# Patient Record
Sex: MALE | Race: WHITE | NOT HISPANIC OR LATINO | Employment: OTHER | URBAN - METROPOLITAN AREA
[De-identification: names, ages, dates, MRNs, and addresses within clinical notes are randomized per-mention and may not be internally consistent; named-entity substitution may affect disease eponyms.]

---

## 2018-08-13 RX ORDER — ASPIRIN 81 MG/1
TABLET ORAL
COMMUNITY

## 2018-08-13 RX ORDER — SIMVASTATIN 40 MG
TABLET ORAL
COMMUNITY
End: 2018-08-15 | Stop reason: ALTCHOICE

## 2018-08-15 ENCOUNTER — OFFICE VISIT (OUTPATIENT)
Dept: UROLOGY | Facility: MEDICAL CENTER | Age: 78
End: 2018-08-15
Payer: MEDICARE

## 2018-08-15 VITALS
WEIGHT: 155 LBS | BODY MASS INDEX: 24.91 KG/M2 | SYSTOLIC BLOOD PRESSURE: 134 MMHG | HEIGHT: 66 IN | DIASTOLIC BLOOD PRESSURE: 74 MMHG

## 2018-08-15 DIAGNOSIS — Z92.3 HISTORY OF RADIATION THERAPY: ICD-10-CM

## 2018-08-15 DIAGNOSIS — C61 PROSTATE CANCER (HCC): ICD-10-CM

## 2018-08-15 DIAGNOSIS — N43.40 SPERMATOCELE OF EPIDIDYMIS: Primary | ICD-10-CM

## 2018-08-15 LAB
SL AMB  POCT GLUCOSE, UA: ABNORMAL
SL AMB LEUKOCYTE ESTERASE,UA: ABNORMAL
SL AMB POCT BILIRUBIN,UA: ABNORMAL
SL AMB POCT BLOOD,UA: ABNORMAL
SL AMB POCT CLARITY,UA: CLEAR
SL AMB POCT COLOR,UA: ABNORMAL
SL AMB POCT KETONES,UA: ABNORMAL
SL AMB POCT NITRITE,UA: ABNORMAL
SL AMB POCT PH,UA: 7
SL AMB POCT SPECIFIC GRAVITY,UA: 1.02
SL AMB POCT URINE PROTEIN: ABNORMAL
SL AMB POCT UROBILINOGEN: 0.2

## 2018-08-15 PROCEDURE — 81003 URINALYSIS AUTO W/O SCOPE: CPT | Performed by: UROLOGY

## 2018-08-15 PROCEDURE — 99204 OFFICE O/P NEW MOD 45 MIN: CPT | Performed by: UROLOGY

## 2018-08-15 RX ORDER — ATORVASTATIN CALCIUM 10 MG/1
10 TABLET, FILM COATED ORAL DAILY
COMMUNITY
End: 2021-09-09 | Stop reason: ALTCHOICE

## 2018-08-15 NOTE — LETTER
August 15, 2018     Jorge Preciado MD  [address] [address]    Patient: Shanti Amezquita   YOB: 1940   Date of Visit: 8/15/2018       Dear Dr Renato Stevens: Thank you for referring Shanti Amezquita to me for evaluation  Below are my notes for this consultation  If you have questions, please do not hesitate to call me  I look forward to following your patient along with you           Sincerely,        Ryder Nolan MD        CC: No Recipients

## 2018-08-15 NOTE — PROGRESS NOTES
Assessment/Plan:      Diagnoses and all orders for this visit:    Spermatocele of epididymis  -     POCT urine dip auto non-scope  -     US scrotum and testicles; Future    Prostate cancer ([facility] [address] )    History of radiation therapy    Other orders  -     aspirin (ADULT ASPIRIN EC LOW STRENGTH) 81 mg EC tablet; Take by mouth  -     Discontinue: simvastatin (ZOCOR) 40 mg tablet; Take by mouth  -     Donepezil HCl (ARICEPT PO); Take by mouth  -     atorvastatin (LIPITOR) 10 mg tablet; Take 10 mg by mouth daily  -     CYANOCOBALAMIN PO; Take by mouth          Subjective:  Left scrotal/testicular mass     Patient ID: Yin Rene is a 77 y o  male  HPI  Last week the patient incidentally noticed a small nontender mass in his left hemiscrotum during a shower  He thought it was attached to his testicle  It does not bother him or cause him any symptoms  He denies any dysuria or urinary problems no fever, flank, inguinal, or testicular pains  No prior history of infection or trauma in that area  He has a history of prostate cancer diagnosed back in 2014 however he finally received definitive treatment with radiation therapy in [place]  He denies any urinary symptoms or problems to report  Review of Systems   Constitutional: Negative  HENT: Negative  Eyes: Negative  Respiratory: Negative  Cardiovascular: Negative  Gastrointestinal: Negative  Endocrine: Negative  Genitourinary: Negative  Negative for difficulty urinating, penile pain, penile swelling and scrotal swelling  Musculoskeletal: Positive for arthralgias, back pain, gait problem and joint swelling  Skin: Negative  Allergic/Immunologic: Negative  Hematological: Negative  Psychiatric/Behavioral: Negative  Objective:     Physical Exam   Constitutional: He is oriented to person, place, and time  He appears well-developed and well-nourished  No distress  HENT:   Head: Normocephalic and atraumatic     Nose: Nose normal    Mouth/Throat: Oropharynx is clear and moist    Eyes: Conjunctivae and EOM are normal  Pupils are equal, round, and reactive to light  No scleral icterus  Neck: Normal range of motion  Neck supple  Pulmonary/Chest: Effort normal  No respiratory distress  Abdominal: Soft  Bowel sounds are normal  He exhibits no distension and no mass  There is no tenderness  Hernia confirmed negative in the right inguinal area and confirmed negative in the left inguinal area  Genitourinary: Penis normal  Left testis shows mass  Left testis shows no swelling and no tenderness  Uncircumcised  Genitourinary Comments: About a 1 to 1 1/2 cm nontender mass in the tail of the epididymis, consistent with a spermatocele   Musculoskeletal: Normal range of motion  He exhibits no edema or tenderness  Lymphadenopathy:     He has no cervical adenopathy  Neurological: He is alert and oriented to person, place, and time  No cranial nerve deficit  Skin: Skin is warm and dry  No rash noted  No erythema  No pallor  Psychiatric: He has a normal mood and affect  His behavior is normal  Judgment and thought content normal    Nursing note and vitals reviewed        PSA from May 2018 was 0 6

## 2018-08-17 ENCOUNTER — HOSPITAL ENCOUNTER (OUTPATIENT)
Dept: ULTRASOUND IMAGING | Facility: HOSPITAL | Age: 78
Discharge: HOME | End: 2018-08-17
Attending: UROLOGY
Payer: MEDICARE

## 2018-08-17 DIAGNOSIS — N43.40 SPERMATOCELE OF EPIDIDYMIS: ICD-10-CM

## 2018-08-17 PROCEDURE — 76870 US EXAM SCROTUM: CPT

## 2021-02-03 ENCOUNTER — IMMUNIZATIONS (OUTPATIENT)
Dept: FAMILY MEDICINE CLINIC | Facility: HOSPITAL | Age: 81
End: 2021-02-03

## 2021-02-03 DIAGNOSIS — Z23 ENCOUNTER FOR IMMUNIZATION: Primary | ICD-10-CM

## 2021-02-03 PROCEDURE — 91301 SARS-COV-2 / COVID-19 MRNA VACCINE (MODERNA) 100 MCG: CPT

## 2021-02-03 PROCEDURE — 0011A SARS-COV-2 / COVID-19 MRNA VACCINE (MODERNA) 100 MCG: CPT

## 2021-03-02 ENCOUNTER — IMMUNIZATIONS (OUTPATIENT)
Dept: FAMILY MEDICINE CLINIC | Facility: HOSPITAL | Age: 81
End: 2021-03-02

## 2021-03-02 DIAGNOSIS — Z23 ENCOUNTER FOR IMMUNIZATION: Primary | ICD-10-CM

## 2021-03-02 PROCEDURE — 0012A SARS-COV-2 / COVID-19 MRNA VACCINE (MODERNA) 100 MCG: CPT

## 2021-03-02 PROCEDURE — 91301 SARS-COV-2 / COVID-19 MRNA VACCINE (MODERNA) 100 MCG: CPT

## 2021-03-11 ENCOUNTER — APPOINTMENT (OUTPATIENT)
Dept: LAB | Facility: HOSPITAL | Age: 81
End: 2021-03-11
Attending: PSYCHIATRY & NEUROLOGY
Payer: MEDICARE

## 2021-03-11 ENCOUNTER — TRANSCRIBE ORDERS (OUTPATIENT)
Dept: ADMINISTRATIVE | Facility: HOSPITAL | Age: 81
End: 2021-03-11

## 2021-03-11 DIAGNOSIS — R53.1 WEAKNESS: ICD-10-CM

## 2021-03-11 DIAGNOSIS — B20: ICD-10-CM

## 2021-03-11 DIAGNOSIS — B20: Primary | ICD-10-CM

## 2021-03-11 LAB
ALBUMIN SERPL BCP-MCNC: 3.5 G/DL (ref 3.5–5)
ALP SERPL-CCNC: 66 U/L (ref 46–116)
ALT SERPL W P-5'-P-CCNC: 17 U/L (ref 12–78)
AST SERPL W P-5'-P-CCNC: 11 U/L (ref 5–45)
BILIRUB DIRECT SERPL-MCNC: 0.09 MG/DL (ref 0–0.2)
BILIRUB SERPL-MCNC: 0.36 MG/DL (ref 0.2–1)
PROT SERPL-MCNC: 6.8 G/DL (ref 6.4–8.2)
TSH SERPL DL<=0.05 MIU/L-ACNC: 1.41 UIU/ML (ref 0.36–3.74)

## 2021-03-11 PROCEDURE — 82746 ASSAY OF FOLIC ACID SERUM: CPT

## 2021-03-11 PROCEDURE — 36415 COLL VENOUS BLD VENIPUNCTURE: CPT

## 2021-03-11 PROCEDURE — 84443 ASSAY THYROID STIM HORMONE: CPT

## 2021-03-11 PROCEDURE — 80076 HEPATIC FUNCTION PANEL: CPT

## 2021-03-11 PROCEDURE — 82607 VITAMIN B-12: CPT

## 2021-03-12 ENCOUNTER — TRANSCRIBE ORDERS (OUTPATIENT)
Dept: ADMINISTRATIVE | Facility: HOSPITAL | Age: 81
End: 2021-03-12

## 2021-03-12 DIAGNOSIS — R41.3 MEMORY LOSS: Primary | ICD-10-CM

## 2021-03-12 LAB
FOLATE SERPL-MCNC: 13.5 NG/ML (ref 3.1–17.5)
VIT B12 SERPL-MCNC: 227 PG/ML (ref 100–900)

## 2021-03-26 ENCOUNTER — HOSPITAL ENCOUNTER (OUTPATIENT)
Dept: MRI IMAGING | Facility: HOSPITAL | Age: 81
Discharge: HOME | End: 2021-03-26
Attending: PSYCHIATRY & NEUROLOGY
Payer: MEDICARE

## 2021-03-26 DIAGNOSIS — R41.3 MEMORY LOSS: ICD-10-CM

## 2021-03-26 PROCEDURE — 70551 MRI BRAIN STEM W/O DYE: CPT

## 2021-03-26 PROCEDURE — G1004 CDSM NDSC: HCPCS

## 2021-04-28 ENCOUNTER — TELEPHONE (OUTPATIENT)
Dept: FAMILY MEDICINE CLINIC | Facility: HOSPITAL | Age: 81
End: 2021-04-28

## 2021-07-14 ENCOUNTER — TELEPHONE (OUTPATIENT)
Dept: GERIATRICS | Age: 81
End: 2021-07-14

## 2021-07-14 NOTE — TELEPHONE ENCOUNTER
[facility]  [address] [place] [address] Drive [address] [place] [address]  [phone]    Telephone Intake: Geriatric Assessment     Referral source: Self Referral                                   Caller who is scheduling/relationship to patient: [facility] [place] (spouse)  Caller's phone number: [phone]              Is there a Power of  in place/If so, who? Yes , spouse     Reason for referral: Family member concerns regarding memory concerns  What is the goal of visit? patient was being followed by Neuro in [place]; has since moved to this area and wants to continue to be followed for his memory/dementia  Family also concerned about being prescribed Memantine Hydrochloride 10MG by his prior Neurologist and has not been followed up with patient/medication  Has the patient been seen by a Neurologist or Geriatrician? Yes (Neurologist)  Has the patient ever been diagnosed with dementia? Yes       Preferred language? English  Highest education level? BS in Chemical Engineering and [employer] (started his [employer])  Does the patient wear glasses? No  Does the patient use hearing aids? No     Does the patient and/or caregiver have access for a virtual visit (computer or smart phone, working audio and video)? No    Caller was informed:    Please make sure the patient is accompanied by someone who knows them well / a caregiver / family member to participate in this appointment  If a patient plans to attend the assessment alone, please route a message to the assigned provider   Primary number on file will be called to confirm this appointment  Who will accompany the patient? Hero Wilson mailed out to: [id number] [facility] [id number]    NOTE FOR : If the patient was recently hospitalized, please route intake to assigned provider for chart review  Neurologist:  Dr Bebo Zavala 54   Phone: [phone]  Fax: [phone]

## 2021-07-22 ENCOUNTER — TELEPHONE (OUTPATIENT)
Dept: GERIATRICS | Age: 81
End: 2021-07-22

## 2021-07-22 NOTE — TELEPHONE ENCOUNTER
Left voice mail asking for call back  Wait list patient called to offer August appts       Offering 8/5 4 pm, 8/12 4pm as assessments and 8/27 at [address] [place] [address] or  for conference visit

## 2021-08-12 ENCOUNTER — CONSULT (OUTPATIENT)
Dept: GERIATRICS | Age: 81
End: 2021-08-12
Payer: MEDICARE

## 2021-08-12 ENCOUNTER — TELEPHONE (OUTPATIENT)
Dept: GERIATRICS | Age: 81
End: 2021-08-12

## 2021-08-12 VITALS
OXYGEN SATURATION: 98 % | SYSTOLIC BLOOD PRESSURE: 150 MMHG | HEART RATE: 64 BPM | RESPIRATION RATE: 16 BRPM | HEIGHT: 65 IN | DIASTOLIC BLOOD PRESSURE: 78 MMHG | BODY MASS INDEX: 24.21 KG/M2 | TEMPERATURE: 97.4 F | WEIGHT: 145.3 LBS

## 2021-08-12 DIAGNOSIS — G31.84 MCI (MILD COGNITIVE IMPAIRMENT): Primary | ICD-10-CM

## 2021-08-12 DIAGNOSIS — E78.49 OTHER HYPERLIPIDEMIA: ICD-10-CM

## 2021-08-12 DIAGNOSIS — H91.90 HEARING LOSS, UNSPECIFIED HEARING LOSS TYPE, UNSPECIFIED LATERALITY: ICD-10-CM

## 2021-08-12 DIAGNOSIS — K59.09 OTHER CONSTIPATION: ICD-10-CM

## 2021-08-12 DIAGNOSIS — R03.0 ELEVATED BP WITHOUT DIAGNOSIS OF HYPERTENSION: ICD-10-CM

## 2021-08-12 PROCEDURE — 99205 OFFICE O/P NEW HI 60 MIN: CPT | Performed by: STUDENT IN AN ORGANIZED HEALTH CARE EDUCATION/TRAINING PROGRAM

## 2021-08-12 NOTE — TELEPHONE ENCOUNTER
Left voicemail message on home phone requesting call back to office    Office to provide information regarding today's appointment in office with provider being virtual

## 2021-08-12 NOTE — ASSESSMENT & PLAN NOTE
/78   Recommend patient follow PCP monitoring encourage a heart healthy diet, physical as able as part an exercise routine

## 2021-08-12 NOTE — PROGRESS NOTES
This note was not shared with the patient due to reasonable likelihood of causing patient harm    Assessment & Plan:   Junior Bowens was seen today for geriatric evaluation  Diagnoses and all orders for this visit:    MCI (mild cognitive impairment)  -     MRI brain NeuroQuant wo contrast; Future  -     MindStreams Assessment; Future    Elevated BP without diagnosis of hypertension    Other hyperlipidemia    Other constipation    Hearing loss, unspecified hearing loss type, unspecified laterality      Elevated BP without diagnosis of hypertension   /78   Recommend patient follow PCP monitoring encourage a heart healthy diet, physical as able as part an exercise routine    MCI (mild cognitive impairment)  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    Will refer for neurotrax testing  Will order MRI neuro quant of the brain for volumetric analysis  Manage vascular risk factors as patient's last lipid panel showed elevated cholesterol and LDL however he has not been taking the atorvastatin, follow-up closely with PCP   Will refer to occupational therapy for fit to drive  Will refer to speech therapy for cognitive rehabilitation  Encourage patient to enroll in a senior center for positive socialization and physical activity  Recommend the Mediterranean diet which has shown to decrease risk of memory loss and CVA   Patient previously on Namenda as well as Aricept with no improvement or delay in symptoms  Encourage online games such as [employer] Universe, Brain HQ, Cognifit  Pending complete workup, if positive findings for Alzheimer's, patient may qualify for adacanumab clinical trial   Will discuss alternative clinical trials at family if they are interested  Reorientation redirection as needed  Manage chronic conditions  Maintain [place] precautions  Encourage patient to remain active mentally, physically and socially  Patient should participate in cognitively challenging exercises as able      Other hyperlipidemia   Lipid panel done 12/20 showed significant hyperlipidemia  Patient currently not compliant with atorvastatin  Recommend adherence to a heart healthy diet, exercise program  Follow-up with PCP for continued monitoring    Other constipation  Encourage increased fiber intake  Consider prune juice 3 oz daily  Per medical records, patient's PCP has documented that the patient is on MiraLax and Benefiber was added however these are not on his medication list   Physical activity as able    Hearing loss   Per wife, patient with a history of hearing loss   Was previously assessed by audiology per wife  Recommend facing patient directly and standing in close proximity when communicating to avoid confusion  Maintain [place] precautions   Consider revisiting audiology in the future          HPI:  We had the pleasure of evaluating Shante Rich who is a 79 y o  male   in Geriatric consultation today  He lives with his wife  Mr Rosy Davis is in the office with his wife      Memory Issues noticed since 5 years ago   Memory affected:  short term memory loss    Symptoms started: gradual  Over time the memory has:  worsened  Memory issue(s) were noted by: family   Patient has difficulties with recalling short-term events    He has no problems operating household appliance such as TV remote, kitchen appliances, computer  This is an 79-year-old male with hyperlipidemia, constipation and cognitive impairment who presents for his initial comprehensive geriatric assessment    The patient currently resides with his wife whom he has been  to for 15 years in a one-suzy home (indoor pool in basement)  He was previously  and has 4 children from that marriage  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  The patient does have a prosthesis for his right leg since childhood  Wife explains that over the past few years, the patient has had progressively worsening short-term memory  She explains that they have lived in various states however due to her chronic conditions, they have now formally settled in [place]  The patient did previously follow with Neurology and Geriatrics in [facility] where he was diagnosed with mild cognitive impairment  Medical records show that the patient was treated with Aricept and Namenda  Wife explains that there have been no overall improvement in cognitive symptoms with these medications nor did  she feel decline was delayed  Since moving to [place], wife explains that the patient was assessed by 2 neurologists however  she states she was not happy with their assessment as a result of which she is here at our office today  She describes the patient as being intermittently confused however is easily redirected  She gives an example of repeatedly describing the same breakfast recipe for the patient however he forgets daily how to berry eggs and ham   She also relates that she seldom drives with the patient as she does not feel safe with his driving  She denies him having any involvement in any motor vehicle accidents nor is she able to state whether he may have gotten lost while driving on his own  He does have several scratches and dents on the car from backing out of the garage  The patient takes his own medications however he is only on aspirin currently    Wife denies the patient having any safety concerns in the home involving the stove, burning food or leaving the faucets running  Wife also explains that over 1 week ago, the patient purchased a progressive car insurance  Upon receiving communication in the mail about the new policy, the patient denied recollecting purchasing the new policy  Wife subsequently did have to call and cancel the  insurance as the patient already had an auto insurance for that vehicle  The majority of the patient's bills are on autopay and wife explains that the patient is very rigid and set in managing his checkbook which he takes with him everywhere  The patient does wear depends at nighttime however wife denies him having any urine or stool incontinence  He does have a history of constipation and previously did drink alcohol often however he no longer does  No recent falls, changes in mood, personality or behavior  The patient continues to tolerate oral intake, has been sleeping well and denies any cardiorespiratory distress, fever, chills, URI or urinary symptoms  During the office visit today, the patient was notably very repetitive though had appropriate conversation for the majority of the interview   Wife was very concerned that she would not be in the room with the patient when he was interviewed by me as she felt he often fabricates information  I explained to the patient's wife that  the patient and family member is usually our intake process and would certainly be aware of her concerns  The patient's wife did relate that the patient is not close to any of his children sadly          He has difficulty finding the right word while speaking: No  Patient requires repeat information or ask the same question repeatedly: Yes  Do you drive: Yes       Have you had any recent accidents, citations or getting lost in familiar places :No  Do you handle your own financial affairs such as balancing your checkbook, paying bills, investments: Yes  Do have any difficulties with handling your financial affairs: Yes  Have you or your family noted any change in your mood or personality:No  Are you currently or have you been treated in the past for depression or anxiety: No  Have you noticed any gait or balance disorder: Yes  Uses :none  Any hallucination or delusion: No  Fluctuation in alertness: No  Sleep Issues: No  Urinary/Stool Incontinence: No  Hearing and vision issue: Yes  Do you have POA:Yes  Do you have a Living will Yes  Past Medical, surgical, social, medication and allergy history and patients previous records reviewed  Family Review of Behavior [facility]:    pacing  No    agressive/combative behavior  No    agitated  No   wandering  No   resistance to care  No   hoarding/hiding objects  No    suspicious  No  Withdrawn:no  inappropriate sexual behaviorNo  rummaging/pillaging  No    misplacing/losing objects No  personal hygiene problems  Yes  forgetfulness of actions Yes   temper outbursts  No     throwing items No      Family member with dementia and what type? no  Have you had any head trauma No  Does patient have history of alcohol abuse No      ROS: Review of Systems   Constitutional: Negative for activity change, chills and fever  HENT: Positive for hearing loss (per wife)  Negative for congestion, ear pain, rhinorrhea and sore throat  Eyes: Negative for discharge  Respiratory: Negative for cough, chest tightness, shortness of breath, wheezing and stridor  Cardiovascular: Negative for chest pain, palpitations and leg swelling  Gastrointestinal: Positive for constipation  Negative for abdominal pain, diarrhea, nausea and vomiting  Genitourinary: Negative for decreased urine volume and dysuria  Musculoskeletal: Positive for gait problem (2/2 leg prosthesis)  Skin: Negative for color change, pallor, rash and wound  Neurological: Negative for dizziness and light-headedness     Psychiatric/Behavioral: Positive for decreased concentration  Negative for sleep disturbance  Allergies:   No Known Allergies    Medications:      Current Outpatient Medications:     aspirin (ADULT ASPIRIN EC LOW STRENGTH) 81 mg EC tablet, Take by mouth, Disp: , Rfl:     Donepezil HCl (ARICEPT PO), Take by mouth, Disp: , Rfl:     atorvastatin (LIPITOR) 10 mg tablet, Take 10 mg by mouth daily (Patient not taking: Reported on 8/12/2021), Disp: , Rfl:     CYANOCOBALAMIN PO, Take by mouth (Patient not taking: Reported on 8/12/2021), Disp: , Rfl:     Vitals:  Vitals:    08/12/21 1609   BP: 150/78   Pulse: 64   Resp: 16   Temp: (!) 97 4 °F (36 3 °C)   SpO2: 98%       History:  Past Medical History:   Diagnosis Date    Personal history of irradiation     [facility]      Past Surgical History:   Procedure Laterality Date    PROSTATE BIOPSY       Family History   Problem Relation Age of Onset    Colon cancer Mother     Nephrolithiasis Father     Stroke Father     Heart disease Father      Social History     Socioeconomic History    Marital status: /Civil Union     Spouse name: Not on file    Number of children: Not on file    Years of education: Not on file    Highest education level: Not on file   Occupational History    Not on file   Tobacco Use    Smoking status: Never Smoker    Smokeless tobacco: Never Used   Substance and Sexual Activity    Alcohol use: Yes     Comment: 2/day    Drug use: No    Sexual activity: Not on file   Other Topics Concern    Not on file   Social History Narrative    Not on file     Social Determinants of Health     Financial Resource Strain:     Difficulty of Paying Living Expenses:    Food Insecurity:     Worried About Running Out of Food in the Last Year:     Ran Out of Food in the Last Year:    Transportation Needs:     Lack of Transportation (Medical):      Lack of Transportation (Non-Medical):    Physical Activity:     Days of Exercise per Week:     Minutes of Exercise per Session: Stress:     Feeling of Stress :    Social Connections:     Frequency of Communication with Friends and Family:     Frequency of Social Gatherings with Friends and Family:     Attends Hoahaoism Services:     Active Member of Clubs or Organizations:     Attends Club or Organization Meetings:     Marital Status:    Intimate Partner Violence:     Fear of Current or Ex-Partner:     Emotionally Abused:     Physically Abused:     Sexually Abused:      Past Surgical History:   Procedure Laterality Date    PROSTATE BIOPSY           Physical Exam:   Physical Exam  Vitals reviewed  Constitutional:       General: He is not in acute distress  Appearance: Normal appearance  He is not ill-appearing or diaphoretic  HENT:      Head: Normocephalic and atraumatic  Right Ear: External ear normal       Left Ear: External ear normal       Nose: Nose normal  No congestion  Mouth/Throat:      Mouth: Mucous membranes are moist    Eyes:      General: No scleral icterus  Right eye: No discharge  Left eye: No discharge  Conjunctiva/sclera: Conjunctivae normal    Pulmonary:      Effort: Pulmonary effort is normal  No respiratory distress  Abdominal:      General: There is no distension  Palpations: Abdomen is soft  Tenderness: There is no abdominal tenderness  Musculoskeletal:         General: Normal range of motion  Cervical back: Normal range of motion  Left lower leg: No edema  Comments: Right leg prosthesis   Skin:     General: Skin is dry  Neurological:      General: No focal deficit present  Mental Status: He is alert  Mental status is at baseline  Gait: Gait abnormal (2/2 prosthesis)
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
## 2021-08-13 NOTE — ASSESSMENT & PLAN NOTE
Per wife, patient with a history of hearing loss   Was previously assessed by audiology per wife  Recommend facing patient directly and standing in close proximity when communicating to avoid confusion  Maintain Falls precautions   Consider revisiting audiology in the future

## 2021-08-13 NOTE — ASSESSMENT & PLAN NOTE
Encourage increased fiber intake  Consider prune juice 3 oz daily  Per medical records, patient's PCP has documented that the patient is on MiraLax and Benefiber was added however these are not on his medication list   Physical activity as able

## 2021-08-13 NOTE — ASSESSMENT & PLAN NOTE
Lipid panel done 12/20 showed significant hyperlipidemia  Patient currently not compliant with atorvastatin  Recommend adherence to a heart healthy diet, exercise program  Follow-up with PCP for continued monitoring

## 2021-08-26 ENCOUNTER — CLINICAL SUPPORT (OUTPATIENT)
Dept: GERIATRICS | Age: 81
End: 2021-08-26
Payer: MEDICARE

## 2021-08-26 ENCOUNTER — HOSPITAL ENCOUNTER (OUTPATIENT)
Dept: MRI IMAGING | Facility: HOSPITAL | Age: 81
Discharge: HOME | End: 2021-08-26
Payer: MEDICARE

## 2021-08-26 DIAGNOSIS — G31.84 MCI (MILD COGNITIVE IMPAIRMENT): ICD-10-CM

## 2021-08-26 PROCEDURE — 70551 MRI BRAIN STEM W/O DYE: CPT

## 2021-08-26 PROCEDURE — 96139 PSYCL/NRPSYC TST TECH EA: CPT | Performed by: STUDENT IN AN ORGANIZED HEALTH CARE EDUCATION/TRAINING PROGRAM

## 2021-08-26 PROCEDURE — 96138 PSYCL/NRPSYC TECH 1ST: CPT | Performed by: STUDENT IN AN ORGANIZED HEALTH CARE EDUCATION/TRAINING PROGRAM

## 2021-08-26 NOTE — PROGRESS NOTES
[address]  [phone]    Carla Pay was here today for [facility] comprehensive test  It took the patient 60 minutes to complete

## 2021-09-01 ENCOUNTER — OFFICE VISIT (OUTPATIENT)
Dept: DERMATOLOGY | Facility: CLINIC | Age: 81
End: 2021-09-01
Payer: MEDICARE

## 2021-09-01 DIAGNOSIS — Z13.89 SCREENING FOR SKIN CONDITION: ICD-10-CM

## 2021-09-01 DIAGNOSIS — L40.9 PSORIASIS: ICD-10-CM

## 2021-09-01 DIAGNOSIS — L98.0 PYOGENIC GRANULOMA: Primary | ICD-10-CM

## 2021-09-01 PROCEDURE — 99203 OFFICE O/P NEW LOW 30 MIN: CPT | Performed by: DERMATOLOGY

## 2021-09-01 PROCEDURE — 11305 SHAVE SKIN LESION 0.5 CM/<: CPT | Performed by: DERMATOLOGY

## 2021-09-01 PROCEDURE — 88305 TISSUE EXAM BY PATHOLOGIST: CPT | Performed by: STUDENT IN AN ORGANIZED HEALTH CARE EDUCATION/TRAINING PROGRAM

## 2021-09-01 RX ORDER — MEMANTINE HYDROCHLORIDE 10 MG/1
10 TABLET ORAL 2 TIMES DAILY
COMMUNITY

## 2021-09-01 NOTE — PATIENT INSTRUCTIONS
Pyogenic granuloma lesion removed hopefully will resolve will recheck in a couple weeks  Psoriasis    Patient advised that this is an autoimmune process where the immune system attacks skin and causes the skin to grow more quickly normally replace of skin cells every 5 for 6 weeks we have psoriasis we replace skin cells every 5 or 6 days  Screening for Dermatologic Disorders: Nothing else of concern noted on complete exam follow up in 1 year

## 2021-09-01 NOTE — PROGRESS NOTES
[facility] DERMATOLOGY  [address] [place] [id number]  [phone]  [phone]     MRN: [id number] : 1940  Encounter: [id number]  Patient Information: Susie Banres  Chief complaint:  Lesion on left 4th finger    History of present illness:  68-year-old male presents for concerns for bleeding lesion on his left 4th finger  Patient was seen by his primary care physician who for for her over for this lesion concerned that it might be a skin cancer  Patient with history of psoriasis he denies any injury to the area  Past Medical History:   Diagnosis Date    Memory loss     Personal history of irradiation     Prostate cancer [facility])      Past Surgical History:   Procedure Laterality Date    PROSTATE BIOPSY       Social History   Social History     Substance and Sexual Activity   Alcohol Use Yes    Comment: 2/day     Social History     Substance and Sexual Activity   Drug Use No     Social History     Tobacco Use   Smoking Status Never Smoker   Smokeless Tobacco Never Used     Family History   Problem Relation Age of Onset    Colon cancer Mother     Nephrolithiasis Father     Stroke Father     Heart disease Father      Meds/Allergies   No Known Allergies    Meds:  Prior to Admission medications    Medication Sig Start Date End Date Taking?  Authorizing Provider   aspirin (ADULT ASPIRIN EC LOW STRENGTH) 81 mg EC tablet Take by mouth   Yes Historical Provider, MD   memantine (NAMENDA) 10 mg tablet Take 10 mg by mouth 2 (two) times a day   Yes Historical Provider, MD   atorvastatin (LIPITOR) 10 mg tablet Take 10 mg by mouth daily  Patient not taking: Reported on 2021    Historical Provider, MD   CYANOCOBALAMIN PO Take by mouth  Patient not taking: Reported on 2021    Historical Provider, MD   Donepezil HCl (ARICEPT PO) Take by mouth  Patient not taking: Reported on 2021    Historical Provider, MD       Subjective:     Review of Systems:    General: negative for - chills, fatigue, fever,  weight gain or weight loss  Psychological: negative for - anxiety, behavioral disorder, concentration difficulties, decreased libido, depression, irritability, memory difficulties, mood swings, sleep disturbances or suicidal ideation  ENT: negative for - hearing difficulties , nasal congestion, nasal discharge, oral lesions, sinus pain, sneezing, sore throat  Allergy and Immunology: negative for - hives, insect bite sensitivity,  Hematological and Lymphatic: negative for - bleeding problems, blood clots,bruising, swollen lymph nodes  Endocrine: negative for - hair pattern changes, hot flashes, malaise/lethargy, mood swings, palpitations, polydipsia/polyuria, skin changes, temperature intolerance or unexpected weight change  Respiratory: negative for - cough, hemoptysis, orthopnea, shortness of breath, or wheezing  Cardiovascular: negative for - chest pain, dyspnea on exertion, edema,  Gastrointestinal: negative for - abdominal pain, nausea/vomiting  Genito-Urinary: negative for - dysuria, incontinence, irregular/heavy menses or urinary frequency/urgency  Musculoskeletal: negative for - gait disturbance, joint pain, joint stiffness, joint swelling, muscle pain, muscular weakness  Dermatological:  As in HPI  Neurological: negative for confusion, dizziness, headaches, impaired coordination/balance, memory loss, numbness/tingling, seizures, speech problems, tremors or weakness       Objective: There were no vitals taken for this visit      Physical Exam:    General Appearance:    Alert, cooperative, no distress   Head:    Normocephalic, without obvious abnormality, atraumatic           Skin:   A full skin exam was performed including scalp, head scalp, eyes, ears, nose, lips, neck, chest, axilla, abdomen, back, buttocks, bilateral upper extremities, bilateral lower extremities, hands, feet, fingers, toes, fingernails, and toenails violaceous papule left 4th finger with friable growth 4 mm size  Erythematous scaling well-demarcated plaque noted on the posterior scalp no other evidence of psoriasis elsewhere   Shave excision Procedure Note    Pre-operative Diagnosis:  Pyogenic granuloma    Plan:  1  Instructed to keep the wound dry and covered for 24 and clean thereafter  2  Warning signs of infection were reviewed  3  Recommended that the patient use OTC acetaminophen as needed for pain  Locations:  Left 4th finger  Indications:  Bleeding lesion    Anesthesia: Lidocaine 1% with epinephrine without added sodium bicarbonate    Procedure Details     Patient informed of the risks (including bleeding and infection) and benefits of the   procedure and Verbal informed consent obtained  The lesion and surrounding area were prepped using alcohol  A Blue blade razor was used to remove the lesion  Hemostasis achieved with aluminum chloride  Petrolatum and a sterile dressing applied  The specimen was sent for pathologic examination  The patient tolerated the procedure(s) well  Complications:  none  Assessment:     1  Pyogenic granuloma     2  Psoriasis     3  Screening for skin condition           Plan:   Pyogenic granuloma lesion removed hopefully will resolve will recheck in a couple weeks  Psoriasis    Patient advised that this is an autoimmune process where the immune system attacks skin and causes the skin to grow more quickly normally replace of skin cells every 5 for 6 weeks we have psoriasis we replace skin cells every 5 or 6 days  Screening for Dermatologic Disorders: Nothing else of concern noted on complete exam follow up in 1 year       Negrito Jiang MD  9/1/2021,3:00 PM    Portions of the record may have been created with voice recognition software   Occasional wrong word or "sound a like" substitutions may have occurred due to the inherent limitations of voice recognition software   Read the chart carefully and recognize, using context, where substitutions have occurred

## 2021-09-09 ENCOUNTER — OFFICE VISIT (OUTPATIENT)
Dept: GERIATRICS | Age: 81
End: 2021-09-09
Payer: MEDICARE

## 2021-09-09 ENCOUNTER — TELEPHONE (OUTPATIENT)
Dept: DERMATOLOGY | Facility: CLINIC | Age: 81
End: 2021-09-09

## 2021-09-09 VITALS
HEART RATE: 66 BPM | WEIGHT: 142.4 LBS | SYSTOLIC BLOOD PRESSURE: 142 MMHG | TEMPERATURE: 97.6 F | HEIGHT: 65 IN | DIASTOLIC BLOOD PRESSURE: 78 MMHG | OXYGEN SATURATION: 96 % | RESPIRATION RATE: 16 BRPM | BODY MASS INDEX: 23.72 KG/M2

## 2021-09-09 DIAGNOSIS — E78.49 OTHER HYPERLIPIDEMIA: ICD-10-CM

## 2021-09-09 DIAGNOSIS — K59.09 OTHER CONSTIPATION: ICD-10-CM

## 2021-09-09 DIAGNOSIS — H91.90 HEARING LOSS, UNSPECIFIED HEARING LOSS TYPE, UNSPECIFIED LATERALITY: ICD-10-CM

## 2021-09-09 DIAGNOSIS — R03.0 ELEVATED BP WITHOUT DIAGNOSIS OF HYPERTENSION: ICD-10-CM

## 2021-09-09 DIAGNOSIS — F03.90 MILD DEMENTIA (HCC): Primary | ICD-10-CM

## 2021-09-09 PROBLEM — F03.A0 MILD DEMENTIA: Status: ACTIVE | Noted: 2021-08-12

## 2021-09-09 PROCEDURE — 99215 OFFICE O/P EST HI 40 MIN: CPT | Performed by: STUDENT IN AN ORGANIZED HEALTH CARE EDUCATION/TRAINING PROGRAM

## 2021-09-09 RX ORDER — ROSUVASTATIN CALCIUM 10 MG/1
TABLET, COATED ORAL
Qty: 30 TABLET | Refills: 1 | Status: SHIPPED | OUTPATIENT
Start: 2021-09-09 | End: 2021-11-16 | Stop reason: SDUPTHER

## 2021-09-09 NOTE — TELEPHONE ENCOUNTER
----- Message from Sammi Mora MD sent at 9/7/2021  6:03 PM EDT -----  Please call him of normal pathology

## 2021-09-10 ENCOUNTER — TELEPHONE (OUTPATIENT)
Dept: GERIATRICS | Age: 81
End: 2021-09-10

## 2021-09-10 NOTE — TELEPHONE ENCOUNTER
----- Message from Deandre Khan MD sent at 9/9/2021  5:36 PM EDT -----  Pls call the pt's wife and let her know that I've sentin rosuvastatin   The pt should start 5mg (half tablet) at nightime for 10 days then if no side effects, increase to 10mg (1 tablet) thereafter

## 2021-09-10 NOTE — PROGRESS NOTES
Yahaira [facility]  [address] [facility], [address] [place] [address], [address] [place] [address]     CARE PLAN CONFERENCE      NAME: Susan Kirk  AGE: 79 y o  SEX: male    DATE OF ENCOUNTER: 9/9/2021    Assessment and Plan     Problem List Items Addressed This Visit        Nervous and Auditory    Mild dementia ([address] ) - Primary    Relevant Orders    Ambulatory referral to Occupational Therapy    Hearing loss       Other    Elevated BP without diagnosis of hypertension    Other hyperlipidemia    Relevant Medications    rosuvastatin (CRESTOR) 10 MG tablet    Other constipation          Orders Placed This Encounter   Procedures    Ambulatory referral to Occupational Therapy       - Counseling Documentation: patient was counseled regarding: diagnostic results, instructions for management, risk factor reductions, prognosis, patient and family education, impressions, risks and benefits of treatment options and importance of compliance with treatment    Chief Complaint     Chief Complaint   Patient presents with    care conference       History of Present Illness     HPI    The following portions of the patient's history were reviewed and updated as appropriate: allergies, current medications, past family history, past medical history, past social history, past surgical history and problem list     Review of Systems     Review of Systems   Constitutional: Positive for activity change  Negative for chills and fever  HENT: Positive for hearing loss  Negative for congestion, ear pain, rhinorrhea and sore throat  Eyes: Negative for discharge  Respiratory: Negative for cough, chest tightness, shortness of breath, wheezing and stridor  Cardiovascular: Negative for chest pain, palpitations and leg swelling  Gastrointestinal: Negative for abdominal pain, constipation, diarrhea, nausea and vomiting  Genitourinary: Negative for decreased urine volume, dysuria and hematuria     Musculoskeletal: Positive for gait problem (2/2 leg prosthesis)  Skin: Negative for color change, pallor, rash and wound  Neurological: Negative for dizziness, weakness and light-headedness  Psychiatric/Behavioral: Positive for decreased concentration  Negative for dysphoric mood, hallucinations and sleep disturbance  Active Problem List     Patient Active Problem List   Diagnosis    Mild dementia ([address] )    Elevated BP without diagnosis of hypertension    Other hyperlipidemia    Other constipation    Hearing loss       Objective     /78 (BP Location: Left arm, Patient Position: Sitting, Cuff Size: Adult)   Pulse 66   Temp 97 6 °F (36 4 °C) (Temporal)   Resp 16   Ht 5' 4 5" (1 638 m)   Wt 64 6 kg (142 lb 6 4 oz)   SpO2 96%   BMI 24 07 kg/m²     Physical Exam  Vitals reviewed  Constitutional:       General: He is not in acute distress  Appearance: Normal appearance  He is well-developed  He is not ill-appearing or diaphoretic  Comments: Elderly male sitting comfortably in chair in no obvious cardiorespiratory or painful distress   HENT:      Head: Normocephalic and atraumatic  Right Ear: Ear canal and external ear normal  There is no impacted cerumen  Left Ear: Ear canal and external ear normal  There is no impacted cerumen  Nose: Nose normal       Mouth/Throat:      Mouth: Mucous membranes are moist       Pharynx: Oropharynx is clear  No oropharyngeal exudate or posterior oropharyngeal erythema  Eyes:      General: No scleral icterus  Right eye: No discharge  Left eye: No discharge  Conjunctiva/sclera: Conjunctivae normal    Neck:      Vascular: No JVD  Cardiovascular:      Rate and Rhythm: Normal rate and regular rhythm  Heart sounds: Murmur heard  No friction rub  No gallop  Pulmonary:      Effort: Pulmonary effort is normal  No respiratory distress  Breath sounds: Normal breath sounds  No wheezing or rales     Abdominal:      General: Bowel sounds are normal  There is no distension  Palpations: Abdomen is soft  Tenderness: There is no abdominal tenderness  There is no guarding  Musculoskeletal:         General: No tenderness or deformity  Normal range of motion  Cervical back: Normal range of motion and neck supple  Left lower leg: No edema  Comments: Right leg prosthesis   Skin:     General: Skin is warm  Coloration: Skin is not pale  Findings: No erythema or rash  Neurological:      General: No focal deficit present  Mental Status: He is alert and oriented to person, place, and time  Mental status is at baseline  Cranial Nerves: No cranial nerve deficit  Gait: Gait abnormal (2/2 prosthesis)  Deep Tendon Reflexes: Reflexes are normal and symmetric  Psychiatric:         Mood and Affect: Mood normal          Pertinent Laboratory/Diagnostic Studies:  Lipid panel: Total cholesterol 243,   TSH/B12/ folate: Within normal limits   MRI NEURO QUANT OF THE BRAIN: moderate microangiopathic change    Normal hippocampal volume, study does not support neuro degeneration    Current Medications     Current Outpatient Medications:     aspirin (ADULT ASPIRIN EC LOW STRENGTH) 81 mg EC tablet, Take by mouth, Disp: , Rfl:     bisacodyl (DULCOLAX) 5 mg EC tablet, Take 5 mg by mouth, Disp: , Rfl:     memantine (NAMENDA) 10 mg tablet, Take 10 mg by mouth 2 (two) times a day, Disp: , Rfl:     CYANOCOBALAMIN PO, Take by mouth (Patient not taking: Reported on 8/12/2021), Disp: , Rfl:     Donepezil HCl (ARICEPT PO), Take by mouth (Patient not taking: Reported on 9/1/2021), Disp: , Rfl:     rosuvastatin (CRESTOR) 10 MG tablet, Take half tablet (5mg) at nightime for 10 days then increase to 1 tablet (10mg) at nightime, Disp: 30 tablet, Rfl: 1    Health Maintenance     Health Maintenance   Topic Date Due    Medicare Annual Wellness Visit (AWV)  Never done    Depression Screening PHQ  Never done    Fall Risk  Never done    Influenza Vaccine (1) 09/01/2021    BMI: Adult  09/09/2022    DTaP,Tdap,and Td Vaccines (3 - Td or Tdap) 06/01/2025    Pneumococcal Vaccine: 65+ Years  Completed    COVID-19 Vaccine  Completed    HIB Vaccine  Aged Out    Hepatitis B Vaccine  Aged Out    IPV Vaccine  Aged Out    Hepatitis A Vaccine  Aged Out    Meningococcal ACWY Vaccine  Aged Out    HPV Vaccine  Aged Out     Immunization History   Administered Date(s) Administered    DTP 03/10/2009    INFLUENZA 11/17/2005, 11/02/2010    Pneumococcal Polysaccharide PPV23 11/23/2004    SARS-CoV-2 / COVID-19 mRNA IM (Hillary Longo) 02/03/2021, 03/02/2021    Zoster 12/02/2008             CARE PLAN CONFERENCE    NAME:  Aleksander Mack ASSESSMENT DATE:  08/12/2021   YOB: 1940 CONFERENCE DATE:  09/09/2021   MRN:  [id number] Primary Care Provider (PCP): William Morales MD   FAMILY PRESENT:   Sparkle Wayne (spouse)   STAFF PRESENT:   Opal Mendez MD, Radha Moise LCSW, Dr Delphine Munoz (resident)   Patient and Family Goals of Care:  Review cognitive decline and associated symptoms, discuss treatment options and care planning  Medical Concerns- Current/Historical:  Elevated BP without diagnosis of hypertension, hyperlipidemia, constipation         Geriatric Syndromes   Dementia, mild   Hearing loss    FINDINGS:  Neuropsychological   Dementia, mild, etiology likely vascular  o Rodrick Cognitive Assessment: 22/30  o NeuroTrax: 91 4 (global cognitive score), 72 2 (memory score)   Depression Screen  o Geriatric Depression Scale: 1/15    Recommendations/Interventions  o Remain active physically, mentally and socially*  o Pharmaceutical and non-pharmaceutical interventions discussed; Patient has been off  Aricept chronically  Also currently not on Namenda as prescription was not refilled  Given MRI findings not supportive of a neurodegenerative process, unsure of effectiveness of Namenda as etiologies  unlikely Alzheimer's    Unless family is willing to use Namenda for an off-label use for possible mixed etiology, they can continue and discuss further with prescribing physician for [address]  However this patient currently is assessed as mild dementia with Namenda typically indicated for moderate to severe dementia  o Recommend Mediterranean diet, shown to decrease risk of memory loss and CVA  o Utilize reorientation and redirection as needed (dependent on situation)  o Participate in cognitively challenging exercises as able; encourage online games such as Streetlife, Brain [place], Summon  o Referral to occupational therapy fitness to drive evaluation, not yet scheduled; if not completed within 30 days, [facility]   o Referral to speech therapy for cognitive rehabilitation  o Maintain chronic conditions under control; imperative to manage vascular risk factors (last lipid panel showed elevated cholesterol, noncompliant with atorvastatin)  o Follow up in six months for retesting, 1 month to review Fit to Drive    Diagnostic Studies   Review of bloodwork (TSH, B12, folate)   Review of MRI NeuroQuant (8/21):  moderate microangiopathic change    Normal hippocampal volume, study does not support neuro degeneration    Social/Safety Concerns   Socialization   Assistance/Supervision   Fall Risk   Medication Management    Recommendations/Interventions  o Encourage enrollment into an adult day center for positive socialization, physical activity, cognitive stimulation and family respite  o Recommend use of fall alert device as a safety precaution  o Recommend [place] on Aging for possible eligible programs such as OPTIONS, Caregiver Support program, or WAIVER   o Consider assistance for medication administration    Long-Term Care Issues   Advance Directives   Caregiver Stress/Concern    o Maintain updated advance directives and provide copy to primary care provider  o Consider Alzheimers Association caregiver support group  o Educational information provided    Physical Findings Impacting Function   Fall Risk Assessment:  Low, Moderate, High (Timed Up and Go Test: 12 seconds)    Activities of Daily Living:  Mostly Independent   Instrumental Activities of Daily Living:  Mostly Independent     Recommendations/Interventions  o Encourage appropriate footwear at all times  o Review fall risk prevention tips and adjust within the home environment as needed     Medications Reviewed  Recommendations/Interventions  o Check with PCP before using over the counter (OTC) medications  o Avoid OTC medications that can affect cognition (e g , Benadryl, Tylenol PM)  o Avoid NSAIDs due to risk of GI bleed and renal impairment    Other Findings   Overall health  o BMI 24 56 kg/m2  o Encourage heart healthy diet and physical activity as able as part of exercise routine  o Continue following with primary care physician regularly   Elevated BP without diagnosis of hypertension  o 150/78 at initial assessment; recommend follow up with PCP for continued monitoring   Hyperlipidemia  o Lipid panel 12/20 showed significant hyperlipidemia; noncompliant with atorvastatin  Discussed the importance of managing vascular risk factors and wife in agreement to restart but requesting switching to rosuvastatin  Prescription called in  Recommend following up with PCP for continued monitoring   Constipation  o Encourage increased fiber intake  o Consider prune juice 3 oz daily   Hearing loss  o Recommend facing Mr Becker directly and standing in close proximity when communicating to avoid confusion  o Maintain falls precautions  o Consider revisiting audiology in the future for re-assessment     Recommended Health Maintenance   Immunizations, if not contraindicated:  Yearly Influenza vaccine, Pneumococcal vaccine every 5 years (65 years and over), Shingles vaccine, COVID-19 vaccine    *Based on current recommendations by the CDC due to COVID-19, please maintain social distancing at this time       Zhang Jung MD  Geriatrics   9/10/2021 6:23 AM

## 2021-09-16 ENCOUNTER — OFFICE VISIT (OUTPATIENT)
Dept: OCCUPATIONAL THERAPY | Facility: CLINIC | Age: 81
End: 2021-09-16
Payer: MEDICARE

## 2021-09-16 DIAGNOSIS — F03.90 MILD DEMENTIA (HCC): Primary | ICD-10-CM

## 2021-09-16 PROCEDURE — 96125 COGNITIVE TEST BY HC PRO: CPT

## 2021-09-16 PROCEDURE — 97166 OT EVAL MOD COMPLEX 45 MIN: CPT

## 2021-09-16 NOTE — PROGRESS NOTES
This note was not shared with the patient due to privacy exception: note includes other individuals      Occupational Therapy Fit to Drive Evaluation:      Attempt #1    Today's Date: 2021  Patient Name: Ava Gupta  : 1940  MRN: [id number]  Referring Provider: Hortencia Erazo MD  Dx: Mild dementia ([facility] ) [F03 90]    Active Problem List:   Patient Active Problem List   Diagnosis    Mild dementia ([address] )    Elevated BP without diagnosis of hypertension    Other hyperlipidemia    Other constipation    Hearing loss     Past Medical Hx:   Past Medical History:   Diagnosis Date    Memory loss     Personal history of irradiation     Prostate cancer ([address] )      Past Surgical Hx:   Past Surgical History:   Procedure Laterality Date    PROSTATE BIOPSY          Pain Levels:   Restin    With Activity:  0      TIME:   OT eval: 520-545  OT DCAT 545-650  Administration, scoring, interpretation >91 mins  Subjective/Patient Goal: "To keep driving"      DCAT/FITNESS TO DRIVE OUTCOMES: Pt would benefit from an on the road driving test to further assess driving abilities  PATIENT'S SCORE: 69 %   DRIVING IMPLICATIONS PER DCAT: Pt scoring cognitively at an 69% predicted probability of failing a specialized on-road test  This indicates a cognitive competence for driving is affected with a greater probability of failing a HUNTER than passing  Individuals scoring in this range have a much higher than average risk of performing a dangerous error on the HUNTER that would affect other road users   Pt scoring cognitively at a 46% probability of creating a hazardous situation on a specialized road test    ADDITIONAL THERAPY NEEDS: Occupational Therapy for cognitive rehabilitation with recommendation for re-testing of DCAT following 3 month wait period      Assessment/Plan  Occupational Therapy Skilled Analysis Assessment and Plan of Care:  Pt is a 79 y o  male referred to Occupational Therapy for Fitness to [address] [place] [address]  Evaluation to assess pts cognitive, visual, and motor abilities to drive safely in community environment  Pt scoring cognitively at an 69% predicted probability of failing a specialized on-road test  This indicates a cognitive competence for driving is affected with a greater probability of failing a HUNTER than passing  Individuals scoring in this range have a much higher than average risk of performing a dangerous error on the HUNTER that would affect other road users  Pt scoring cognitively at a 46% probability of creating a hazardous situation on a specialized road test   Below average scoring was noted in the following areas:  Initialization and reactions  Track and process visual events  Ability to encode, retrieve and/or respond to stimuli  Judgement of spatial relations  Guided movement control  Impulse control  D/C from OT caseload, D/C OT  MD to discuss results w/ pt  History of Present Illness:  Pt is a 79 y o  male seen for OT Fitness to Drive evaluation to assess pts cognitive, visual, and motor abilities to drive safely in community environment  Pt referred from Aris Puentes MD from Geriatrics  Pt reports he drives everyday and once in a while he forgets how to get to a place, and he does not have any issue driving to familiar places  Pt states his memory is a challenge at times  Pt states if unable to drive, it will be more difficult for his wife who has health issues of her own  Below is a summary of patients current or most recent driving history including vehicle type, roads traveled, and recent auto events      Driving History:    Communication Status: Britt Crespo    Visual History:  last Eye Dr  Appointment a few years; had lasik surgery   Glasses/Contacts:   Yes, describe: uses reading glasses   Cataracts:  Yes, describe: had surgery before lasik surgery before 2007   Glaucoma:   No   Hemianopsia:   No         License Status: active    Age of Initial Licensure: 15    Vehicle Type:     SUV     Car Transfer: indep    Driving History:   GPS Use: Yes, describe: when driving to some unfamiliar places   Recent Accidents:   No   Tickets:   No   DUI:   No    Last Drove: Today; drives daily    Driving Goals:   Local Roads, Highway/Major Roads, Daytime Driving and Nighttime driving      Objective    DCAT: (see attached report for further details)   Pt scoring an 69% likelihood on failing on road   Probability of creating a hazardous situation on specialized on-road test: 46%; see attached report for further details  Recommend On Road Assessment?:   Yes, describe: an on the road driving test may provide valuable insight into this patient's functional driving abiities        Recommend to [employer] for [employer] [place] [employer] [place]?: No     Physical findings:    cervical rotation:  R WNL, L WNL   UE and LE AROM:  WNL  UE/LE : 4+/5 MMT       INTERVENTION COMMENTS:  Diagnosis: Mild dementia ([facility] ) [F03 90]  Precautions: fall risk  FOTO: N/A for FTD Evaluations  Insurance: Payor: Jesi Posey / Plan: MEDICARE A AND B / Product Type: Medicare A & B Fee for Service /

## 2021-09-16 NOTE — PROGRESS NOTES
[address]  [phone]  Care Conference    LCSW participated in today's conference and provided the following resources:    General Information  - 10 Ways to Love Your Brain  - Memory loss ladder  - Packet with Alzheimer's Association information including: definition of dementia, communication tips for different stages, common behaviors and response strategies    Caregiver Support  - Flyer for [employer]  [facility] Virtual Caregiver Support Group (meeting monthly by [employer])  - Alzheimer's Association Rx Pad (guide to website and 24/7 helpline, 8-444.405.1740)    Safety  - CDC fall prevention / home safety checklist    Additional Resources  - [employer] brochures  - Occupational therapy fitness to drive brochure "What to Expect During your Upcoming DriveABLE Cognitive Assessment"    LCSW to remain available as needed

## 2021-09-17 ENCOUNTER — TELEPHONE (OUTPATIENT)
Dept: GERIATRICS | Age: 81
End: 2021-09-17

## 2021-09-17 NOTE — TELEPHONE ENCOUNTER
Called patient's wife informed her that Dr Zeferino You received patient's fit to drive results and will discuss them at patients next appointment in October  Patient's wife requested an earlier appointment so she could receive patients results sooner  I informed patient's wife that Dr Zeferino You is booked and this was a courtesy call just to inform her that patients results were back  Patient's wife said she understood and will just have to wait until patients follow up appointment in October  ----- Message from Irvin Whitmore MD sent at 9/17/2021  9:32 AM EDT -----   Please call the patient's wife and let her know I have received his fit to drive results    We will discuss at his appointment in October

## 2021-09-17 NOTE — TELEPHONE ENCOUNTER
Contacted [employer] at [phone] and spoke with Governor Rodney, who confirmed that [employer] can deliver to [facility] voicemail for [facility] to follow up regarding [employer], as provided at care conference  Noted confirmation that [employer] delivers all across the country (as [facility] was curious about their service area)  With any questions, provided direct line for return call

## 2021-09-22 ENCOUNTER — CLINICAL SUPPORT (OUTPATIENT)
Dept: DERMATOLOGY | Facility: CLINIC | Age: 81
End: 2021-09-22
Payer: MEDICARE

## 2021-09-22 DIAGNOSIS — L98.0 PYOGENIC GRANULOMA: Primary | ICD-10-CM

## 2021-09-22 PROCEDURE — 99212 OFFICE O/P EST SF 10 MIN: CPT | Performed by: DERMATOLOGY

## 2021-09-22 NOTE — PROGRESS NOTES
[facility] DERMATOLOGY  [address] [place] [id number]  [phone]  [phone]     MRN: [id number] : 1940  Encounter: [id number]  Patient Information: Del Parish    Subjective:     24-year-old male presents for follow-up for previously excised pyogenic granuloma  Objective: There were no vitals taken for this visit  Physical Exam:    General Appearance:    Alert, cooperative, no distress   Skin:   Previous site well-healed without signs of recurrence     Assessment:     1  Pyogenic granuloma           Plan:   No further therapy recommended will plan follow-up again if any sign of regrowth is occurring      Prior to Admission medications    Medication Sig Start Date End Date Taking? Authorizing Provider   aspirin (ADULT ASPIRIN EC LOW STRENGTH) 81 mg EC tablet Take by mouth    Historical Provider, MD   bisacodyl (DULCOLAX) 5 mg EC tablet Take 5 mg by mouth    Historical Provider, MD   CYANOCOBALAMIN PO Take by mouth  Patient not taking: Reported on 2021    Historical Provider, MD   Donepezil HCl (ARICEPT PO) Take by mouth  Patient not taking: Reported on 2021    Historical Provider, MD   memantine (NAMENDA) 10 mg tablet Take 10 mg by mouth 2 (two) times a day    Historical Provider, MD   rosuvastatin (CRESTOR) 10 MG tablet Take half tablet (5mg) at nightime for 10 days then increase to 1 tablet (10mg) at nightime 21   Aron Mckeon MD     No Known Allergies    Enid Goins MD  ,4:40 PM    Portions of the record may have been created with voice recognition software   Occasional wrong word or "sound a like" substitutions may have occurred due to the inherent limitations of voice recognition software   Read the chart carefully and recognize, using context, where substitutions have occurred

## 2021-09-23 ENCOUNTER — OFFICE VISIT (OUTPATIENT)
Dept: FAMILY MEDICINE CLINIC | Facility: CLINIC | Age: 81
End: 2021-09-23
Payer: MEDICARE

## 2021-09-23 VITALS
DIASTOLIC BLOOD PRESSURE: 90 MMHG | OXYGEN SATURATION: 97 % | HEIGHT: 65 IN | BODY MASS INDEX: 23.66 KG/M2 | WEIGHT: 142 LBS | TEMPERATURE: 98 F | HEART RATE: 77 BPM | SYSTOLIC BLOOD PRESSURE: 160 MMHG

## 2021-09-23 DIAGNOSIS — I10 ESSENTIAL (PRIMARY) HYPERTENSION: ICD-10-CM

## 2021-09-23 DIAGNOSIS — F03.90 DEMENTIA WITHOUT BEHAVIORAL DISTURBANCE, UNSPECIFIED DEMENTIA TYPE (HCC): Primary | ICD-10-CM

## 2021-09-23 DIAGNOSIS — E78.49 OTHER HYPERLIPIDEMIA: ICD-10-CM

## 2021-09-23 DIAGNOSIS — Z00.00 HEALTHCARE MAINTENANCE: ICD-10-CM

## 2021-09-23 DIAGNOSIS — K59.04 CHRONIC IDIOPATHIC CONSTIPATION: ICD-10-CM

## 2021-09-23 DIAGNOSIS — C61 PROSTATE CANCER (HCC): ICD-10-CM

## 2021-09-23 PROCEDURE — 99204 OFFICE O/P NEW MOD 45 MIN: CPT | Performed by: NURSE PRACTITIONER

## 2021-09-23 RX ORDER — CALCIPOTRIENE 50 UG/G
CREAM TOPICAL
COMMUNITY

## 2021-09-23 RX ORDER — LISINOPRIL 5 MG/1
5 TABLET ORAL DAILY
Qty: 90 TABLET | Refills: 0 | Status: SHIPPED | OUTPATIENT
Start: 2021-09-23 | End: 2022-01-05 | Stop reason: DRUGHIGH

## 2021-09-23 RX ORDER — MELOXICAM 7.5 MG/1
TABLET ORAL
COMMUNITY

## 2021-09-23 NOTE — PROGRESS NOTES
Assessment/Plan:     Encounter to establish care  Intake completed  Patient is being seen by [facility]  Patient has mild dementia  Has been taking Namenda  Has been taking Crestor for his cholesterol  Has elevated blood pressures has not been taking any medications  Essential (primary) hypertension  Patient's blood pressure elevated in office  Reviewed home blood pressures  Mildly elevated 140s over 80s  Will start low dose of lisinopril  Continue to monitor blood pressures  Keep a log  Call if no improvement  Chronic idiopathic constipation  Reports frequency with constipation  Discussed increasing fluid hydration, vegetable intake increasing exercise activity  Advised not to take laxative daily  Problem List Items Addressed This Visit        Digestive    Chronic idiopathic constipation     Reports frequency with constipation  Discussed increasing fluid hydration, vegetable intake increasing exercise activity  Advised not to take laxative daily  Cardiovascular and Mediastinum    Essential (primary) hypertension     Patient's blood pressure elevated in office  Reviewed home blood pressures  Mildly elevated 140s over 80s  Will start low dose of lisinopril  Continue to monitor blood pressures  Keep a log  Call if no improvement  Relevant Medications    lisinopril (ZESTRIL) 5 mg tablet       Genitourinary    Prostate cancer ([facility] [address] )       Other    Other hyperlipidemia    Relevant Orders    Lipid panel      Other Visit Diagnoses     Dementia without behavioral disturbance, unspecified dementia type [facility])    -  Primary    Relevant Orders    Ambulatory referral to Neurology    Healthcare maintenance        Relevant Orders    Comprehensive metabolic panel    CBC and differential            Subjective:      Patient ID: Garcia Quezada is a 79 y o  male  Patient is here to establish care    Last primary care provider- Dr Ramon Guevara  Past medical history-dementia, hearing loss prostate cancer and elevated blood pressure  Past surgical history-prosthesis rt leg as a child    Social history-  -  Kids-5  Employment- Retired   Smoker- no  Alcohol- beer  Other drugs-none  Last diagnostic labs screening-current  Dental exam- due  Eyes- lasix surgery-       Colonoscopy- not indicated  Prostate cancer screening-  h/o  Current concerns- elevated blood pressure         The following portions of the patient's history were reviewed and updated as appropriate: allergies, current medications, past family history, past medical history, past social history, past surgical history and problem list     Review of Systems   Constitutional: Negative  HENT: Negative  Eyes: Negative  Respiratory: Negative  Cardiovascular: Negative  Gastrointestinal: Negative  Endocrine: Negative  Genitourinary: Negative  Musculoskeletal: Negative  Skin: Positive for rash (psoriasis on his hands)  Allergic/Immunologic: Negative  Neurological: Negative  Hematological: Negative  Psychiatric/Behavioral: Positive for decreased concentration  Objective:      /90 (BP Location: Right arm, Patient Position: Sitting)   Pulse 77   Temp 98 °F (36 7 °C) (Tympanic)   Ht 5' 4 5" (1 638 m)   Wt 64 4 kg (142 lb)   SpO2 97%   BMI 24 00 kg/m²          Physical Exam  Vitals and nursing note reviewed  Constitutional:       Appearance: He is well-developed  Cardiovascular:      Rate and Rhythm: Normal rate and regular rhythm  Abdominal:      Palpations: Abdomen is soft  Musculoskeletal:         General: Normal range of motion  Skin:     General: Skin is warm and dry  Neurological:      Mental Status: He is alert and oriented to person, place, and time     Psychiatric:         Attention and Perception: Attention and perception normal          Mood and Affect: Mood and affect normal          Speech: Speech normal          Behavior: Behavior normal  Thought Content: Thought content normal          Cognition and Memory: Cognition normal  He exhibits impaired recent memory           Judgment: Judgment normal            Labs:    No results found for: WBC, HGB, HCT, MCV, PLT  Lab Results   Component Value Date     09/25/2015    K 4 1 09/25/2015     (H) 09/25/2015    CO2 29 09/25/2015    ANIONGAP 4 09/25/2015    BUN 19 09/25/2015    CREATININE 0 70 09/25/2015    GLUCOSE 85 09/25/2015    CALCIUM 8 8 09/25/2015    AST 11 03/11/2021    ALT 17 03/11/2021    ALKPHOS 66 03/11/2021    PROT 7 2 09/25/2015    BILITOT 0 55 09/25/2015     Lab Results   Component Value Date    GLUCOSE 85 09/25/2015    CALCIUM 8 8 09/25/2015     09/25/2015    K 4 1 09/25/2015    CO2 29 09/25/2015     (H) 09/25/2015    BUN 19 09/25/2015    CREATININE 0 70 09/25/2015

## 2021-09-23 NOTE — PATIENT INSTRUCTIONS
Blood pressure daily and keep log to see if the medication is effective the goal of the blood pressure is 120/80   increase vegetable intake and fluid intake to help with constipation also increase exercise activity every few hours get up and take a walk      Constipation   WHAT YOU NEED TO KNOW:   Constipation means you have hard, dry bowel movements, or you go longer than usual between bowel movements  DISCHARGE INSTRUCTIONS:   Call your doctor if:   · You have blood in your bowel movements  · You have a fever and abdominal pain with the constipation  · Your constipation gets worse  · You start to vomit  · You have questions or concerns about your condition or care  Medicines:   · Medicine  such as a laxative may help relax and loosen your intestines to help you have a bowel movement  Your provider may recommend you only use laxatives for a short time  Long-term use may make your bowels dependent on the medicine  · Take your medicine as directed  Contact your healthcare provider if you think your medicine is not helping or if you have side effects  Tell him of her if you are allergic to any medicine  Keep a list of the medicines, vitamins, and herbs you take  Include the amounts, and when and why you take them  Bring the list or the pill bottles to follow-up visits  Carry your medicine list with you in case of an emergency  Relieve constipation:   · A suppository  may be used to help soften your bowel movements  This may make them easier to pass  A suppository is guided into your rectum through your anus  · An enema  is liquid medicine used to clear bowel movement from your rectum  The medicine is put into your rectum through your anus  Prevent constipation:   · Drink liquids as directed  You may need to drink extra liquids to help soften and move your bowels  Ask how much liquid to drink each day and which liquids are best for you  · Eat high-fiber foods    This may help decrease constipation by adding bulk to your bowel movements  High-fiber foods include fruit, vegetables, whole-grain breads and cereals, and beans  Your healthcare provider or dietitian can help you create a high-fiber meal plan  Your provider may also recommend a fiber supplement if you cannot get enough fiber from food  · Exercise regularly  Regular physical activity can help stimulate your intestines  Walking is a good exercise to prevent or relieve constipation  Ask which exercises are best for you  · Schedule a time each day to have a bowel movement  This may help train your body to have regular bowel movements  Bend forward while you are on the toilet to help move the bowel movement out  Sit on the toilet for at least 10 minutes, even if you do not have a bowel movement  · Talk to your healthcare provider about your medicines  Certain medicines, such as opioids, can cause constipation  Your provider may be able to make medicine changes  For example, he or she may change the kind of medicine, or change when you take it  Follow up with your healthcare provider as directed:  Write down your questions so you remember to ask them during your visits  © Copyright [employer] 2021 Information is for End User's use only and may not be sold, redistributed or otherwise used for commercial purposes  All illustrations and images included in CareNotes® are the copyrighted property of A D A M , Inc  or [facility] Yaneli Loving   The above information is an  only  It is not intended as medical advice for individual conditions or treatments  Talk to your doctor, nurse or pharmacist before following any medical regimen to see if it is safe and effective for you

## 2021-09-24 PROBLEM — Z76.89 ENCOUNTER TO ESTABLISH CARE: Status: ACTIVE | Noted: 2021-09-24

## 2021-09-24 PROBLEM — K59.04 CHRONIC IDIOPATHIC CONSTIPATION: Status: ACTIVE | Noted: 2021-09-24

## 2021-09-24 PROBLEM — I10 ESSENTIAL (PRIMARY) HYPERTENSION: Status: ACTIVE | Noted: 2021-09-24

## 2021-09-24 NOTE — ASSESSMENT & PLAN NOTE
Reports frequency with constipation  Discussed increasing fluid hydration, vegetable intake increasing exercise activity  Advised not to take laxative daily

## 2021-09-24 NOTE — ASSESSMENT & PLAN NOTE
Patient's blood pressure elevated in office  Reviewed home blood pressures  Mildly elevated 140s over 80s  Will start low dose of lisinopril  Continue to monitor blood pressures  Keep a log  Call if no improvement

## 2021-09-24 NOTE — ASSESSMENT & PLAN NOTE
Intake completed  Patient is being [facility] by [facility]  Patient has mild dementia  Has been taking Namenda  Has been taking Crestor for his cholesterol  Has elevated blood pressures has not been taking any medications

## 2021-10-21 ENCOUNTER — OFFICE VISIT (OUTPATIENT)
Dept: GERIATRICS | Age: 81
End: 2021-10-21
Payer: MEDICARE

## 2021-10-21 VITALS
OXYGEN SATURATION: 94 % | DIASTOLIC BLOOD PRESSURE: 74 MMHG | HEART RATE: 58 BPM | HEIGHT: 65 IN | BODY MASS INDEX: 23.06 KG/M2 | RESPIRATION RATE: 17 BRPM | WEIGHT: 138.4 LBS | SYSTOLIC BLOOD PRESSURE: 132 MMHG | TEMPERATURE: 97.9 F

## 2021-10-21 DIAGNOSIS — Z91.89 DRIVING SAFETY ISSUE: ICD-10-CM

## 2021-10-21 DIAGNOSIS — H91.90 HEARING LOSS, UNSPECIFIED HEARING LOSS TYPE, UNSPECIFIED LATERALITY: ICD-10-CM

## 2021-10-21 DIAGNOSIS — F03.90 MILD DEMENTIA (HCC): Primary | ICD-10-CM

## 2021-10-21 PROCEDURE — 99214 OFFICE O/P EST MOD 30 MIN: CPT | Performed by: STUDENT IN AN ORGANIZED HEALTH CARE EDUCATION/TRAINING PROGRAM

## 2021-10-22 ENCOUNTER — TELEPHONE (OUTPATIENT)
Dept: GERIATRICS | Age: 81
End: 2021-10-22

## 2021-10-22 PROBLEM — Z91.89 DRIVING SAFETY ISSUE: Status: ACTIVE | Noted: 2021-10-22

## 2021-10-25 ENCOUNTER — TELEPHONE (OUTPATIENT)
Dept: GERIATRICS | Age: 81
End: 2021-10-25

## 2021-10-26 ENCOUNTER — TELEPHONE (OUTPATIENT)
Dept: GERIATRICS | Age: 81
End: 2021-10-26

## 2021-11-19 PROCEDURE — U0005 INFEC AGEN DETEC AMPLI PROBE: HCPCS | Performed by: NURSE PRACTITIONER

## 2021-11-19 PROCEDURE — U0003 INFECTIOUS AGENT DETECTION BY NUCLEIC ACID (DNA OR RNA); SEVERE ACUTE RESPIRATORY SYNDROME CORONAVIRUS 2 (SARS-COV-2) (CORONAVIRUS DISEASE [COVID-19]), AMPLIFIED PROBE TECHNIQUE, MAKING USE OF HIGH THROUGHPUT TECHNOLOGIES AS DESCRIBED BY CMS-2020-01-R: HCPCS | Performed by: NURSE PRACTITIONER

## 2021-11-22 ENCOUNTER — TELEPHONE (OUTPATIENT)
Dept: FAMILY MEDICINE CLINIC | Facility: CLINIC | Age: 81
End: 2021-11-22

## 2021-11-22 ENCOUNTER — TELEPHONE (OUTPATIENT)
Dept: INFUSION CENTER | Facility: HOSPITAL | Age: 81
End: 2021-11-22

## 2021-11-23 ENCOUNTER — TELEMEDICINE (OUTPATIENT)
Dept: FAMILY MEDICINE CLINIC | Facility: CLINIC | Age: 81
End: 2021-11-23
Payer: MEDICARE

## 2021-11-23 DIAGNOSIS — U09.9 POST COVID-19 CONDITION, UNSPECIFIED: Primary | ICD-10-CM

## 2021-11-23 PROCEDURE — 99442 PR PHYS/QHP TELEPHONE EVALUATION 11-20 MIN: CPT | Performed by: NURSE PRACTITIONER

## 2021-12-22 ENCOUNTER — IMMUNIZATIONS (OUTPATIENT)
Dept: FAMILY MEDICINE CLINIC | Facility: HOSPITAL | Age: 81
End: 2021-12-22

## 2021-12-22 DIAGNOSIS — Z23 ENCOUNTER FOR IMMUNIZATION: Primary | ICD-10-CM

## 2021-12-22 PROCEDURE — 91306 COVID-19 MODERNA VACC 0.25 ML BOOSTER: CPT

## 2021-12-22 PROCEDURE — 0064A COVID-19 MODERNA VACC 0.25 ML BOOSTER: CPT

## 2021-12-28 ENCOUNTER — TELEPHONE (OUTPATIENT)
Dept: GERIATRICS | Age: 81
End: 2021-12-28

## 2022-01-05 ENCOUNTER — OFFICE VISIT (OUTPATIENT)
Dept: FAMILY MEDICINE CLINIC | Facility: CLINIC | Age: 82
End: 2022-01-05
Payer: MEDICARE

## 2022-01-05 VITALS
SYSTOLIC BLOOD PRESSURE: 142 MMHG | OXYGEN SATURATION: 97 % | TEMPERATURE: 97.1 F | HEART RATE: 74 BPM | DIASTOLIC BLOOD PRESSURE: 78 MMHG | BODY MASS INDEX: 24.03 KG/M2 | WEIGHT: 144.2 LBS | HEIGHT: 65 IN

## 2022-01-05 DIAGNOSIS — F03.90 MILD DEMENTIA (HCC): ICD-10-CM

## 2022-01-05 DIAGNOSIS — R53.1 GENERALIZED WEAKNESS: Primary | ICD-10-CM

## 2022-01-05 DIAGNOSIS — Z00.00 MEDICARE ANNUAL WELLNESS VISIT, SUBSEQUENT: ICD-10-CM

## 2022-01-05 DIAGNOSIS — E78.49 OTHER HYPERLIPIDEMIA: ICD-10-CM

## 2022-01-05 DIAGNOSIS — Z11.59 NEED FOR HEPATITIS C SCREENING TEST: ICD-10-CM

## 2022-01-05 DIAGNOSIS — Z00.00 HEALTH CARE MAINTENANCE: ICD-10-CM

## 2022-01-05 DIAGNOSIS — I10 ESSENTIAL (PRIMARY) HYPERTENSION: ICD-10-CM

## 2022-01-05 DIAGNOSIS — C61 PROSTATE CANCER (HCC): ICD-10-CM

## 2022-01-05 PROCEDURE — G0438 PPPS, INITIAL VISIT: HCPCS | Performed by: NURSE PRACTITIONER

## 2022-01-05 PROCEDURE — 99214 OFFICE O/P EST MOD 30 MIN: CPT | Performed by: NURSE PRACTITIONER

## 2022-01-05 PROCEDURE — 1123F ACP DISCUSS/DSCN MKR DOCD: CPT | Performed by: NURSE PRACTITIONER

## 2022-01-05 RX ORDER — LISINOPRIL 10 MG/1
10 TABLET ORAL DAILY
Qty: 90 TABLET | Refills: 3 | Status: SHIPPED | OUTPATIENT
Start: 2022-01-05

## 2022-01-05 NOTE — PROGRESS NOTES
Assessment and Plan:     Problem List Items Addressed This Visit        Cardiovascular and Mediastinum    Essential (primary) hypertension    Relevant Medications    lisinopril (ZESTRIL) 10 mg tablet    Other Relevant Orders    CBC and differential    Comprehensive metabolic panel       Nervous and Auditory    Mild dementia ([facility] [address] )       Genitourinary    Prostate cancer ([facility] [address] )       Other    Other hyperlipidemia    Relevant Orders    Lipid panel    Medicare annual wellness visit, subsequent     Medicare wellness assessment completed  Up-to-date on screenings  Discussed maintaining safety  Patient does have dementia  discussion with wife regarding maintaining safety  Other Visit Diagnoses     Generalized weakness    -  Primary    Relevant Orders    Ambulatory referral to Physical Therapy    Health care maintenance        Relevant Orders    CBC and differential    Comprehensive metabolic panel    TSH, 3rd generation with Free T4 reflex    Need for hepatitis C screening test        Relevant Orders    Hepatitis C antibody          Depression Screening and Follow-up Plan: Patient was screened for depression during today's encounter  They screened negative with a PHQ-2 score of 0  Preventive health issues were discussed with patient, and age appropriate screening tests were ordered as noted in patient's After Visit Summary  Personalized health advice and appropriate referrals for health education or preventive services given if needed, as noted in patient's After Visit Summary       History of Present Illness:     Patient presents for Medicare Annual Wellness visit    Patient Care Team:  [address]  as PCP - General (Family Medicine)  MD Stephanie Langston MD     Problem List:     Patient Active Problem List   Diagnosis    Mild dementia ([facility] [address] )    Elevated BP without diagnosis of hypertension    Other hyperlipidemia    Other constipation    Hearing loss    Prostate cancer ([facility] [address] )  Encounter to establish care    Essential (primary) hypertension    Chronic idiopathic constipation    Driving safety issue    Post covid-19 condition, unspecified    Medicare annual wellness visit, subsequent      Past Medical and Surgical History:     Past Medical History:   Diagnosis Date    Memory loss     Personal history of irradiation     Prostate cancer ([address] )      Past Surgical History:   Procedure Laterality Date    PROSTATE BIOPSY        Family History:     Family History   Problem Relation Age of Onset    Colon cancer Mother     Nephrolithiasis Father     Stroke Father     Heart disease Father       Social History:     Social History     Socioeconomic History    Marital status: /Civil Union     Spouse name: Not on file    Number of children: Not on file    Years of education: Not on file    Highest education level: Not on file   Occupational History    Not on file   Tobacco Use    Smoking status: Never Smoker    Smokeless tobacco: Never Used   Vaping Use    Vaping Use: Never used   Substance and Sexual Activity    Alcohol use: Yes     Comment: 2/day    Drug use: No    Sexual activity: Not on file   Other Topics Concern    Not on file   Social History Narrative    Not on file     Social Determinants of Health     Financial Resource Strain: Not on file   Food Insecurity: Not on file   Transportation Needs: Not on file   Physical Activity: Not on file   Stress: Not on file   Social Connections: Not on file   Intimate Partner Violence: Not on file   Housing Stability: Not on file      Medications and Allergies:     Current Outpatient Medications   Medication Sig Dispense Refill    aspirin (ADULT ASPIRIN EC LOW STRENGTH) 81 mg EC tablet Take by mouth      Aspirin Buf,CaCarb-MgCarb-MgO, 81 MG TABS Take by mouth      rosuvastatin (CRESTOR) 10 MG tablet Take half tablet (5mg) at nightime for 10 days then increase to 1 tablet (10mg) at nightime 90 tablet 1    bisacodyl (DULCOLAX) 5 mg EC tablet Take 5 mg by mouth (Patient not taking: Reported on 9/23/2021)      calcipotriene (Dovonex) 0 005 % cream Apply topically (Patient not taking: Reported on 1/5/2022 )      CYANOCOBALAMIN PO Take by mouth (Patient not taking: Reported on 8/12/2021)      lisinopril (ZESTRIL) 10 mg tablet Take 1 tablet (10 mg total) by mouth daily 90 tablet 3    meloxicam (MOBIC) 7 5 mg tablet meloxicam 7 5 mg tablet   TAKE 1 TABLET BY MOUTH EVERY DAY AS NEEDED (Patient not taking: Reported on 9/23/2021)      memantine (NAMENDA) 10 mg tablet Take 10 mg by mouth 2 (two) times a day (Patient not taking: Reported on 9/23/2021)       No current facility-administered medications for this visit  No Known Allergies   Immunizations:     Immunization History   Administered Date(s) Administered    COVID-19 MODERNA VACC 0 25 ML IM BOOSTER 12/22/2021    COVID-19 MODERNA VACC 0 5 ML IM 02/03/2021, 03/02/2021    DTP 03/10/2009    INFLUENZA 11/17/2005, 11/02/2010    Pneumococcal Polysaccharide PPV23 11/23/2004    Zoster 12/02/2008      Health Maintenance: There are no preventive care reminders to display for this patient  Topic Date Due    Influenza Vaccine (1) 09/01/2021      Medicare Health Risk Assessment:     /78 (BP Location: Left arm, Patient Position: Sitting)   Pulse 74   Temp (!) 97 1 °F (36 2 °C) (Tympanic)   Ht 5' 5" (1 651 m)   Wt 65 4 kg (144 lb 3 2 oz)   SpO2 97%   BMI 24 00 kg/m²      Levy Check is here for his Subsequent Wellness visit  Last Medicare Wellness visit information reviewed, patient interviewed and updates made to the record today  Health Risk Assessment:   Patient rates overall health as good  Patient feels that their physical health rating is slightly worse  Patient is satisfied with their life  Eyesight was rated as same  Hearing was rated as same  Patient feels that their emotional and mental health rating is same  Patients states they are sometimes angry  Patient states they are never, rarely unusually tired/fatigued  Patient states that he has experienced no weight loss or gain in last 6 months  Depression Screening:   PHQ-2 Score: 0      Fall Risk Screening: In the past year, patient has experienced: no history of falling in past year      Home Safety:  Patient does not have trouble with stairs inside or outside of their home  Patient has working smoke alarms and has working carbon monoxide detector  Home safety hazards include: none  Nutrition:   Current diet is Regular  Medications:   Patient is not currently taking any over-the-counter supplements  Patient is able to manage medications  Activities of Daily Living (ADLs)/Instrumental Activities of Daily Living (IADLs):   Walk and transfer into and out of bed and chair?: Yes  Dress and groom yourself?: Yes    Bathe or shower yourself?: Yes    Feed yourself? Yes  Do your laundry/housekeeping?: Yes  Manage your money, pay your bills and track your expenses?: Yes  Make your own meals?: Yes    Do your own shopping?: Yes    Previous Hospitalizations:   Any hospitalizations or ED visits within the last 12 months?: No      Advance Care Planning:   Living will: Yes    Advanced directive: Yes      Comments:  Will bring copy    Cognitive Screening:   Provider or family/friend/caregiver concerned regarding cognition?: Yes  Mini-Mental Status Exam (MMSE) Score: 26  Interpretation: MMSE Score 24-30: Normal Cognition     Cognition Comments: Pt has been diagnosed with dementia    PREVENTIVE SCREENINGS      Cardiovascular Screening:    General: Screening Not Indicated and History Lipid Disorder      Colorectal Cancer Screening:     General: Screening Not Indicated      Prostate Cancer Screening:    General: History Prostate Cancer and Screening Not Indicated      Osteoporosis Screening:    General: Patient Declines      Abdominal Aortic Aneurysm (AAA) Screening:        General: Screening Not Indicated      Lung Cancer Screening:     General: Screening Not Indicated      Hepatitis C Screening:      Hep C Screening Accepted: Yes      Screening, Brief Intervention, and Referral to Treatment (SBIRT)    Screening  Typical number of drinks in a day: 1  Typical number of drinks in a week: 7  Interpretation: Low risk drinking behavior  Single Item Drug Screening:  How often have you used an illegal drug (including marijuana) or a prescription medication for non-medical reasons in the past year? never    Single Item Drug Screen Score: 0  Interpretation: Negative screen for possible drug use disorder    Other Counseling Topics:   Car/seat belt/driving safety, skin self-exam, sunscreen and regular weightbearing exercise and calcium and vitamin D intake         MARIA A Watters

## 2022-01-05 NOTE — PATIENT INSTRUCTIONS
Constipation   WHAT YOU NEED TO KNOW:   Constipation means you have hard, dry bowel movements, or you go longer than usual between bowel movements  DISCHARGE INSTRUCTIONS:   Call your doctor if:   · You have blood in your bowel movements  · You have a fever and abdominal pain with the constipation  · Your constipation gets worse  · You start to vomit  · You have questions or concerns about your condition or care  Medicines:   · Medicine  such as a laxative may help relax and loosen your intestines to help you have a bowel movement  Your provider may recommend you only use laxatives for a short time  Long-term use may make your bowels dependent on the medicine  · Take your medicine as directed  Contact your healthcare provider if you think your medicine is not helping or if you have side effects  Tell him of her if you are allergic to any medicine  Keep a list of the medicines, vitamins, and herbs you take  Include the amounts, and when and why you take them  Bring the list or the pill bottles to follow-up visits  Carry your medicine list with you in case of an emergency  Relieve constipation:   · A suppository  may be used to help soften your bowel movements  This may make them easier to pass  A suppository is guided into your rectum through your anus  · An enema  is liquid medicine used to clear bowel movement from your rectum  The medicine is put into your rectum through your anus  Prevent constipation:   · Drink liquids as directed  You may need to drink extra liquids to help soften and move your bowels  Ask how much liquid to drink each day and which liquids are best for you  · Eat high-fiber foods  This may help decrease constipation by adding bulk to your bowel movements  High-fiber foods include fruit, vegetables, whole-grain breads and cereals, and beans  Your healthcare provider or dietitian can help you create a high-fiber meal plan   Your provider may also recommend a fiber supplement if you cannot get enough fiber from food  · Exercise regularly  Regular physical activity can help stimulate your intestines  Walking is a good exercise to prevent or relieve constipation  Ask which exercises are best for you  · Schedule a time each day to have a bowel movement  This may help train your body to have regular bowel movements  Bend forward while you are on the toilet to help move the bowel movement out  Sit on the toilet for at least 10 minutes, even if you do not have a bowel movement  · Talk to your healthcare provider about your medicines  Certain medicines, such as opioids, can cause constipation  Your provider may be able to make medicine changes  For example, he or she may change the kind of medicine, or change when you take it  Follow up with your doctor as directed:  Write down your questions so you remember to ask them during your visits  © Copyright [employer] 2021 Information is for End User's use only and may not be sold, redistributed or otherwise used for commercial purposes  All illustrations and images included in CareNotes® are the copyrighted property of A D A M , Inc  or [facility] Yaneli Loving   The above information is an  only  It is not intended as medical advice for individual conditions or treatments  Talk to your doctor, nurse or pharmacist before following any medical regimen to see if it is safe and effective for you  Medicare Preventive Visit Patient Instructions  Thank you for completing your Welcome to Medicare Visit or Medicare Annual Wellness Visit today  Your next wellness visit will be due in one year (1/6/2023)  The screening/preventive services that you may require over the next 5-10 years are detailed below  Some tests may not apply to you based off risk factors and/or age  Screening tests ordered at today's visit but not completed yet may show as past due   Also, please note that scanned in results may not display below  Preventive Screenings:  Service Recommendations Previous Testing/Comments   Colorectal Cancer Screening  · Colonoscopy    · Fecal Occult Blood Test (FOBT)/Fecal Immunochemical Test (FIT)  · Fecal DNA/Cologuard Test  · Flexible Sigmoidoscopy Age: 54-65 years old   Colonoscopy: every 10 years (May be performed more frequently if at higher risk)  OR  FOBT/FIT: every 1 year  OR  Cologuard: every 3 years  OR  Sigmoidoscopy: every 5 years  Screening may be recommended earlier than age 48 if at higher risk for colorectal cancer  Also, an individualized decision between you and your healthcare provider will decide whether screening between the ages of 74-80 would be appropriate  Colonoscopy: Not on file  FOBT/FIT: Not on file  Cologuard: Not on file  Sigmoidoscopy: Not on file          Prostate Cancer Screening Individualized decision between patient and health care provider in men between ages of 53-78   Medicare will cover every 12 months beginning on the day after your 50th birthday PSA: No results in last 5 years     History Prostate Cancer  Screening Not Indicated     Hepatitis C Screening Once for adults born between [birth year] and 1965  More frequently in patients at high risk for Hepatitis C Hep C Antibody: Not on file        Diabetes Screening 1-2 times per year if you're at risk for diabetes or have pre-diabetes Fasting glucose: No results in last 5 years   A1C: 5 5 %        Cholesterol Screening Once every 5 years if you don't have a lipid disorder  May order more often based on risk factors  Lipid panel: 08/27/2021    Screening Not Indicated  History Lipid Disorder      Other Preventive Screenings Covered by Medicare:  1  Abdominal Aortic Aneurysm (AAA) Screening: covered once if your at risk  You're considered to be at risk if you have a family history of AAA or a male between the age of 73-68 who smoking at least 100 cigarettes in your lifetime    2  Lung Cancer Screening: covers low dose CT scan once per year if you meet all of the following conditions: (1) Age 50-69; (2) No signs or symptoms of lung cancer; (3) Current smoker or have quit smoking within the last 15 years; (4) You have a tobacco smoking history of at least 30 pack years (packs per day x number of years you smoked); (5) You get a written order from a healthcare provider  3  Glaucoma Screening: covered annually if you're considered high risk: (1) You have diabetes OR (2) Family history of glaucoma OR (3)  aged 48 and older OR (3)  American aged 72 and older  3  Osteoporosis Screening: covered every 2 years if you meet one of the following conditions: (1) Have a vertebral abnormality; (2) On glucocorticoid therapy for more than 3 months; (3) Have primary hyperparathyroidism; (4) On osteoporosis medications and need to assess response to drug therapy  5  HIV Screening: covered annually if you're between the age of 12-76  Also covered annually if you are younger than 13 and older than 72 with risk factors for HIV infection  For pregnant patients, it is covered up to 3 times per pregnancy  Immunizations:  Immunization Recommendations   Influenza Vaccine Annual influenza vaccination during flu season is recommended for all persons aged >= 6 months who do not have contraindications   Pneumococcal Vaccine (Prevnar and Pneumovax)  * Prevnar = PCV13  * Pneumovax = PPSV23 Adults 25-60 years old: 1-3 doses may be recommended based on certain risk factors  Adults 72 years old: Prevnar (PCV13) vaccine recommended followed by Pneumovax (PPSV23) vaccine  If already received PPSV23 since turning 65, then PCV13 recommended at least one year after PPSV23 dose     Hepatitis B Vaccine 3 dose series if at intermediate or high risk (ex: diabetes, end stage renal disease, liver disease)   Tetanus (Td) Vaccine - COST NOT COVERED BY MEDICARE PART B Following completion of primary series, a booster dose should be given every 10 years to maintain immunity against tetanus  Td may also be given as tetanus wound prophylaxis  Tdap Vaccine - COST NOT COVERED BY MEDICARE PART B Recommended at least once for all adults  For pregnant patients, recommended with each pregnancy  Shingles Vaccine (Shingrix) - COST NOT COVERED BY MEDICARE PART B  2 shot series recommended in those aged 48 and above     Health Maintenance Due:  There are no preventive care reminders to display for this patient  Immunizations Due:      Topic Date Due    Influenza Vaccine (1) 09/01/2021     Advance Directives   What are advance directives? Advance directives are legal documents that state your wishes and plans for medical care  These plans are made ahead of time in case you lose your ability to make decisions for yourself  Advance directives can apply to any medical decision, such as the treatments you want, and if you want to donate organs  What are the types of advance directives? There are many types of advance directives, and each state has rules about how to use them  You may choose a combination of any of the following:  · Living will: This is a written record of the treatment you want  You can also choose which treatments you do not want, which to limit, and which to stop at a certain time  This includes surgery, medicine, IV fluid, and tube feedings  · Durable power of  for healthcare [facility]): This is a written record that states who you want to make healthcare choices for you when you are unable to make them for yourself  This person, called a proxy, is usually a family member or a friend  You may choose more than 1 proxy  · Do not resuscitate (DNR) order:  A DNR order is used in case your heart stops beating or you stop breathing  It is a request not to have certain forms of treatment, such as CPR  A DNR order may be included in other types of advance directives  · Medical directive:   This covers the care that you want if you are in a coma, near death, or unable to make decisions for yourself  You can list the treatments you want for each condition  Treatment may include pain medicine, surgery, blood transfusions, dialysis, IV or tube feedings, and a ventilator (breathing machine)  · Values history: This document has questions about your views, beliefs, and how you feel and think about life  This information can help others choose the care that you would choose  Why are advance directives important? An advance directive helps you control your care  Although spoken wishes may be used, it is better to have your wishes written down  Spoken wishes can be misunderstood, or not followed  Treatments may be given even if you do not want them  An advance directive may make it easier for your family to make difficult choices about your care  © Copyright [employer] 2018 Information is for End User's use only and may not be sold, redistributed or otherwise used for commercial purposes   All illustrations and images included in CareNotes® are the copyrighted property of [employer] M , Inc  or [address] [place] [address]

## 2022-01-06 PROBLEM — R53.1 GENERALIZED WEAKNESS: Status: ACTIVE | Noted: 2022-01-06

## 2022-01-06 PROBLEM — Z00.00 MEDICARE ANNUAL WELLNESS VISIT, SUBSEQUENT: Status: ACTIVE | Noted: 2022-01-06

## 2022-01-06 NOTE — ASSESSMENT & PLAN NOTE
Medicare wellness assessment completed  Up-to-date on screenings  Discussed maintaining safety  Patient does have dementia  discussion with wife regarding maintaining safety

## 2022-01-06 NOTE — ASSESSMENT & PLAN NOTE
Patient reports blood pressure continues to be elevated 140s over 80s to 90s after medication  Will increase lisinopril to 10 mg  Maintain heart healthy diet  Will get blood work done

## 2022-01-06 NOTE — PROGRESS NOTES
Assessment/Plan:    Depression Screening and Follow-up Plan: Patient was screened for depression during today's encounter  They screened negative with a PHQ-2 score of 0  Medicare annual wellness visit, subsequent  Medicare wellness assessment completed  Up-to-date on screenings  Discussed maintaining safety  Patient does have dementia  discussion with wife regarding maintaining safety  Generalized weakness    Discussed safety  Discussed maintaining exercise and movement  Referral made to physical therapy  Essential (primary) hypertension    Patient reports blood pressure continues to be elevated 140s over 80s to 90s after medication  Will increase lisinopril to 10 mg  Maintain heart healthy diet  Will get blood work done  Mild dementia [facility])    Discussed safety with wife and patient  Continue to do brain strength and exercises, works hurts to do so  Discussed long-term management with wife and patient  Currently has no long-term plan of care in place  Advised to speak with family and make plans for long-term management of dementia         Problem List Items Addressed This Visit        Cardiovascular and Mediastinum    Essential (primary) hypertension       Patient reports blood pressure continues to be elevated 140s over 80s to 90s after medication  Will increase lisinopril to 10 mg  Maintain heart healthy diet  Will get blood work done  Relevant Medications    lisinopril (ZESTRIL) 10 mg tablet    Other Relevant Orders    CBC and differential    Comprehensive metabolic panel       Nervous and Auditory    Mild dementia ([facility] [address] )       Discussed safety with wife and patient  Continue to do brain strength and exercises, works hurts to do so  Discussed long-term management with wife and patient  Currently has no long-term plan of care in place    Advised to speak with family and make plans for long-term management of dementia            Genitourinary    Prostate cancer ([facility] [address] ) Other    Other hyperlipidemia    Relevant Orders    Lipid panel    Medicare annual wellness visit, subsequent     Medicare wellness assessment completed  Up-to-date on screenings  Discussed maintaining safety  Patient does have dementia  discussion with wife regarding maintaining safety  Generalized weakness - Primary       Discussed safety  Discussed maintaining exercise and movement  Referral made to physical therapy  Relevant Orders    Ambulatory referral to Physical Therapy      Other Visit Diagnoses     Health care maintenance        Relevant Orders    CBC and differential    Comprehensive metabolic panel    TSH, 3rd generation with Free T4 reflex    Need for hepatitis C screening test        Relevant Orders    Hepatitis C antibody            Subjective:      Patient ID: Emory Adler is a 79 y o  male  Patient is here for follow-up  Reports that his blood pressure continues to be elevated 140s over 80s after taking the medication  Complaints of generalized weakness, wife reports that he has been getting weaker  Does a lot of sitting is not very active  Has problems with balance issues  Continues to have problems with memory, has a diagnosis of dementia  The following portions of the patient's history were reviewed and updated as appropriate: allergies, current medications, past family history, past medical history, past social history, past surgical history and problem list     Review of Systems   Constitutional: Positive for fatigue  HENT: Negative  Eyes: Negative  Respiratory: Negative  Cardiovascular: Negative  Gastrointestinal: Positive for constipation  Endocrine: Negative  Genitourinary: Negative  Musculoskeletal: Negative  Skin: Negative  Allergic/Immunologic: Negative  Neurological: Negative  Hematological: Negative  Psychiatric/Behavioral: Negative            Objective:      /78 (BP Location: Left arm, Patient Position: Sitting)   Pulse 74   Temp (!) 97 1 °F (36 2 °C) (Tympanic)   Ht 5' 5" (1 651 m)   Wt 65 4 kg (144 lb 3 2 oz)   SpO2 97%   BMI 24 00 kg/m²          Physical Exam  Vitals and nursing note reviewed  Constitutional:       Appearance: He is well-developed  HENT:      Head: Normocephalic and atraumatic  Eyes:      Pupils: Pupils are equal, round, and reactive to light  Cardiovascular:      Rate and Rhythm: Normal rate and regular rhythm  Pulmonary:      Effort: Pulmonary effort is normal       Breath sounds: Normal breath sounds  Musculoskeletal:         General: Normal range of motion  Cervical back: Normal range of motion  Skin:     General: Skin is warm and dry  Neurological:      Mental Status: He is oriented to person, place, and time  Mental status is at baseline  Sensory: No sensory deficit  Motor: Weakness present  Gait: Gait abnormal    Psychiatric:         Attention and Perception: Attention and perception normal          Mood and Affect: Mood and affect normal          Speech: Speech normal          Behavior: Behavior normal          Thought Content: Thought content normal          Cognition and Memory: He exhibits impaired recent memory           Judgment: Judgment normal            Labs:    No results found for: WBC, HGB, HCT, MCV, PLT  Lab Results   Component Value Date     09/25/2015    K 4 1 09/25/2015     (H) 09/25/2015    CO2 29 09/25/2015    ANIONGAP 4 09/25/2015    BUN 19 09/25/2015    CREATININE 0 70 09/25/2015    GLUCOSE 85 09/25/2015    CALCIUM 8 8 09/25/2015    AST 11 03/11/2021    ALT 17 03/11/2021    ALKPHOS 66 03/11/2021    PROT 7 2 09/25/2015    BILITOT 0 55 09/25/2015     Lab Results   Component Value Date    GLUCOSE 85 09/25/2015    CALCIUM 8 8 09/25/2015     09/25/2015    K 4 1 09/25/2015    CO2 29 09/25/2015     (H) 09/25/2015    BUN 19 09/25/2015    CREATININE 0 70 09/25/2015

## 2022-01-06 NOTE — ASSESSMENT & PLAN NOTE
Discussed safety with wife and patient  Continue to do brain strength and exercises, works hurts to do so  Discussed long-term management with wife and patient  Currently has no long-term plan of care in place    Advised to speak with family and make plans for long-term management of dementia

## 2022-01-07 ENCOUNTER — TELEPHONE (OUTPATIENT)
Dept: GASTROENTEROLOGY | Facility: CLINIC | Age: 82
End: 2022-01-07

## 2022-01-07 NOTE — TELEPHONE ENCOUNTER
Recall call went out via [employer] in 2020 with no return calls from pt to schedule  Pt is due for a 1yr f/u appt with Dr Piero Fernandez   Recall letter mailed to pt

## 2022-01-11 ENCOUNTER — EVALUATION (OUTPATIENT)
Dept: PHYSICAL THERAPY | Facility: CLINIC | Age: 82
End: 2022-01-11
Payer: MEDICARE

## 2022-01-11 DIAGNOSIS — R53.1 GENERALIZED WEAKNESS: ICD-10-CM

## 2022-01-11 PROCEDURE — 97162 PT EVAL MOD COMPLEX 30 MIN: CPT | Performed by: PHYSICAL THERAPIST

## 2022-01-11 PROCEDURE — 97530 THERAPEUTIC ACTIVITIES: CPT | Performed by: PHYSICAL THERAPIST

## 2022-01-11 NOTE — PROGRESS NOTES
PT Evaluation     Today's date: 2022  Patient name: Demond Mays  : 1940  MRN: [id number]  Referring provider: MARIA A Tamez  Dx:   Encounter Diagnosis     ICD-10-CM    1  Generalized weakness  R53 1 Ambulatory referral to Physical Therapy       Start Time: 1255  Stop Time: 1330  Total time in clinic (min): 35 minutes    Assessment  Assessment details: Patient is a 79 y o  male who presents to physical therapy with c/o generalized weakness  Patient presents to evaluation with decreased strength, decreased balance, and decreased tolerance to activity  Pt has history of significant leg length discrepancy on right causing hx of several hip surgeries and use of prosthetic devices and uses prosthesis on right leg causing gait abnormality and decreased balance, however ambulates unassisted and denies hx of falls  Treatment was limited today due to pt arriving late to evaluation  I discussed risks, benefits, and alternatives to treatment, and answered all patient questions to patient satisfaction  Patient presents with baseline FOTO score of 73 indicating limited tolerance/ability to complete ADLs  Patient is an appropriate candidate for skilled PT and would benefit from skilled PT services to address the aforementioned impairments, achieve goals, maximize function, and improve quality of life  Pt is in agreement with this plan      Patient Education: activity modifications as needed, pacing of activities, importance of HEP compliance, PT prognosis/POC    Impairments: abnormal gait, activity intolerance, impaired balance, impaired physical strength, lacks appropriate home exercise program and safety issue  Understanding of Dx/Px/POC: good   Prognosis: good    Goals  ST weeks  Pt will demonstrate good understanding and compliance with HEP   Pt will ambulate with least restrictive AD with adequate step-through gait mechanics including proper toe clearance during mid-swing phase of gait to reduce fall risk    LT weeks  Pt will improve FOTO score to > or = to 78 to indicate improved functional abilities   Pt will increase bilateral hip/knee strength to 4+/5 to allow for improved ability to squat, negotiate stairs, and for community ambulation   Pt will increase 5xSTS time to 18" without reliance on UE assistance and good eccentric control  Pt will improve TUG time to 10" to indicate improved functional strength, gait speed, and safety awareness          Plan  Patient would benefit from: PT eval and skilled physical therapy  Planned therapy interventions: ADL training, body mechanics training, manual therapy, neuromuscular re-education, patient education, strengthening, stretching, therapeutic activities, therapeutic exercise, home exercise program, graded exercise, graded activity, gait training, functional ROM exercises, flexibility and balance  Frequency: 2x week  Duration in weeks: 8  Plan of Care beginning date: 2022  Plan of Care expiration date: 3/8/2022  Treatment plan discussed with: patient, family and caregiver        Subjective Evaluation    History of Present Illness  Mechanism of injury: Pt reports to PT with c/o generalized weakness that has progressively worsened with time due to inactivity  Pt states he is usually somewhat active over the warmer months as they have a pool but didn't get in the pool this year and hasn't been doing as much due to decreased motivation and in turn has noticed more weakness  Pt denies hx of falls  Pt feels week with prolonged standing/walking and less tolerance for activities  Pt notes increased challenge with prolonged walking a few minutes, getting up from a seated position, stairs      PMH: Dementia, HTN, Hx of prostate cancer, hx of right leg discrepancy and wears prothesis on right  Pain  No pain reported    Patient Goals  Patient goals for therapy: independence with ADLs/IADLs, increased strength, improved balance and return to sport/leisure activities          Objective     Functional Assessment        Comments  Pt ambulates unassisted with step to gait; prosthesis donned on right secondary to significant leg length discrepancy     Bilateral hip strength globally 3+/5 to 4-/5    Left Knee MMT: Flex 5-/5 Ext 5-/5  Right Knee MMT: Flex 4/5 Ext 4/5    UE strength globally 5-/5    5xSTS: 28 2" with 1 airex pad and 1 UE for assist, poor/fair ecc control with heavy LLE emphasis due to right leg length discrepancy    TU 9" from chair w/ 1 airex pad and 1 UE assist, walking unassisted             Diagnosis: Generalized weakness   Precautions: Dementia, HTN, Hx of prostate cancer, hx of right leg discrepancy and wears prothesis on right   Primary Goals: Improve strength/endurance    POC Expires: 3/8/22   Re-evaluation Date: 22    FOTO Scores/Date: Goal - 73   Visit Count 1/10       Manuals                                                Ther Ex        SLR        SL hip abd        Bridges                                                        Neuro Re-Ed                                                                                                                       Ther Act             Sit to stand        Step ups        lunges        Lateral band walk                         Functional testing 5xSTS: 28 2" TU 9"           Pt education KD           Modalities

## 2022-01-12 ENCOUNTER — OFFICE VISIT (OUTPATIENT)
Dept: GERIATRICS | Age: 82
End: 2022-01-12
Payer: MEDICARE

## 2022-01-12 VITALS
SYSTOLIC BLOOD PRESSURE: 120 MMHG | WEIGHT: 145.2 LBS | BODY MASS INDEX: 24.19 KG/M2 | RESPIRATION RATE: 18 BRPM | DIASTOLIC BLOOD PRESSURE: 68 MMHG | TEMPERATURE: 97.3 F | HEART RATE: 57 BPM | OXYGEN SATURATION: 96 % | HEIGHT: 65 IN

## 2022-01-12 DIAGNOSIS — Z91.89 DRIVING SAFETY ISSUE: ICD-10-CM

## 2022-01-12 DIAGNOSIS — H91.90 HEARING LOSS, UNSPECIFIED HEARING LOSS TYPE, UNSPECIFIED LATERALITY: ICD-10-CM

## 2022-01-12 DIAGNOSIS — I10 ESSENTIAL (PRIMARY) HYPERTENSION: ICD-10-CM

## 2022-01-12 DIAGNOSIS — K59.04 CHRONIC IDIOPATHIC CONSTIPATION: ICD-10-CM

## 2022-01-12 DIAGNOSIS — R53.1 GENERALIZED WEAKNESS: ICD-10-CM

## 2022-01-12 DIAGNOSIS — F03.90 MILD DEMENTIA (HCC): Primary | ICD-10-CM

## 2022-01-12 PROCEDURE — 99214 OFFICE O/P EST MOD 30 MIN: CPT | Performed by: NURSE PRACTITIONER

## 2022-01-12 NOTE — PROGRESS NOTES
ASSESSMENT AND PLAN:  1  Mild dementia [facility])  Assessment & Plan:  · [place] cognitive assessment score 22/30 today  Last visit in August 2021 the score was the same, 22/30  · Patient reports worsening short term memory  · Will consult ST for cognitive rehabilitation services  · Discussed dementia medications with both the patient and wife  They do not wish to start medications at this time due to risk of side effects  · Maintain a heart healthy diet  · Continue to do puzzles at home such as Sodoku  · Encourage physical and social activities as tolerated  · Manage chronic health conditions with PCP  · Follow-up in 6 months    Orders:  -     Ambulatory Referral to Speech Therapy; Future; Expected date: 01/13/2022    2  Hearing loss, unspecified hearing loss type, unspecified laterality  Assessment & Plan:  · Referral to Audiology for comprehensive hearting test    Orders:  -     Ambulatory Referral to Audiology; Future    3  Driving safety issue  Assessment & Plan:  · Patient is no longer driving  · Continue at home assistance      4  Essential (primary) hypertension  Assessment & Plan:  · BP currently stable and controlled with increase in Lisinopril to 10 mg mary  · Continue heart healthy diet and regular exercise as tolerated      5  Chronic idiopathic constipation  Assessment & Plan:  · Continue to increase fluid hydration and fiber intake  · If constipation continues, recommend a stool softner daily  6  Generalized weakness  Assessment & Plan:  · Continue physical therapy  · Maintain adequate PO food and fluid intake  · Fall precautions in the home advised            HPI:    We had the pleasure of evaluating Elsy Antonio who is a 79 y o  male with hypertension, hyperlipidemia, hearing loss, constipation, history of prostate cancer in Geriatric consultation today  Mr Amparo Milton is in the office with his wife   He lives with his wife       Upon examination, the patient is calm, cooperative and in no acute distress  He states that he feels like his short term memory is declining  He is no longer driving, he gave up his 's license after scoring a 69% probability of failing an on the road test and 46% probability of creating a hazardous situation on a road test   He is currently receiving physical therapy services  No recent fall or hospitalizations reported  He is able to perform all of his own ADLs  His appetite is good  His wife is looking into a at home meal service such as [employer]  ROS: Review of Systems   Constitutional: Negative for chills, diaphoresis, fatigue, fever and unexpected weight change  HENT: Positive for hearing loss  Negative for congestion, ear pain and postnasal drip  Eyes: Negative for pain, discharge, itching and visual disturbance  Respiratory: Negative for cough, chest tightness, shortness of breath and wheezing  Cardiovascular: Negative for chest pain, palpitations and leg swelling  Gastrointestinal: Positive for constipation  Negative for abdominal distention, diarrhea, nausea and vomiting  Occasional constipation   Genitourinary: Negative for difficulty urinating, flank pain, frequency and urgency  Musculoskeletal: Positive for gait problem  Negative for arthralgias, back pain, joint swelling, myalgias, neck pain and neck stiffness  Related to right leg prosthesis   Skin: Negative for color change, pallor, rash and wound  Neurological: Negative for dizziness, weakness, numbness and headaches  Psychiatric/Behavioral: Positive for confusion  Negative for dysphoric mood  The patient is not nervous/anxious           Short term memory loss       No Known Allergies    Medications:    Current Outpatient Medications on File Prior to Visit   Medication Sig Dispense Refill    aspirin (ADULT ASPIRIN EC LOW STRENGTH) 81 mg EC tablet Take by mouth      bisacodyl (DULCOLAX) 5 mg EC tablet Take 5 mg by mouth        lisinopril (ZESTRIL) 10 mg tablet Take 1 tablet (10 mg total) by mouth daily 90 tablet 3    rosuvastatin (CRESTOR) 10 MG tablet Take half tablet (5mg) at nightime for 10 days then increase to 1 tablet (10mg) at nightime 90 tablet 1    Aspirin Buf,CaCarb-MgCarb-MgO, 81 MG TABS Take by mouth (Patient not taking: Reported on 1/12/2022 )      calcipotriene (Dovonex) 0 005 % cream Apply topically (Patient not taking: Reported on 1/12/2022 )      CYANOCOBALAMIN PO Take by mouth   (Patient not taking: Reported on 1/12/2022 )      meloxicam (MOBIC) 7 5 mg tablet meloxicam 7 5 mg tablet   TAKE 1 TABLET BY MOUTH EVERY DAY AS NEEDED (Patient not taking: Reported on 1/12/2022)      memantine (NAMENDA) 10 mg tablet Take 10 mg by mouth 2 (two) times a day   (Patient not taking: Reported on 1/12/2022 )       No current facility-administered medications on file prior to visit         History:  Past Medical History:   Diagnosis Date    Memory loss     Personal history of irradiation     Prostate cancer ([facility] [address] )      Past Surgical History:   Procedure Laterality Date    PROSTATE BIOPSY       Family History   Problem Relation Age of Onset    Colon cancer Mother     Nephrolithiasis Father     Stroke Father     Heart disease Father      Social History     Socioeconomic History    Marital status: /Civil Union     Spouse name: Not on file    Number of children: Not on file    Years of education: Not on file    Highest education level: Not on file   Occupational History    Not on file   Tobacco Use    Smoking status: Never Smoker    Smokeless tobacco: Never Used   Vaping Use    Vaping Use: Never used   Substance and Sexual Activity    Alcohol use: Yes     Comment: 2/day    Drug use: No    Sexual activity: Not on file   Other Topics Concern    Not on file   Social History Narrative    Not on file     Social Determinants of Health     Financial Resource Strain: Not on file   Food Insecurity: Not on file   Transportation Needs: Not on file   Physical Activity: Not on file   Stress: Not on file   Social Connections: Not on file   Intimate Partner Violence: Not on file   Housing Stability: Not on file     Past Surgical History:   Procedure Laterality Date    PROSTATE BIOPSY         OBJECTIVE:  Vitals:    01/12/22 1423   BP: 120/68   BP Location: Left arm   Patient Position: Sitting   Cuff Size: Standard   Pulse: 57   Resp: 18   Temp: (!) 97 3 °F (36 3 °C)   TempSrc: Temporal   SpO2: 96%   Weight: 65 9 kg (145 lb 3 2 oz)   Height: 5' 4 5" (1 638 m)     Body mass index is 24 54 kg/m²  Physical Exam  Constitutional:       General: He is not in acute distress  Appearance: Normal appearance  He is not ill-appearing, toxic-appearing or diaphoretic  HENT:      Right Ear: External ear normal  There is impacted cerumen  Left Ear: Tympanic membrane, ear canal and external ear normal  There is no impacted cerumen  Nose: Nose normal  No congestion or rhinorrhea  Mouth/Throat:      Mouth: Mucous membranes are moist       Pharynx: Oropharynx is clear  No oropharyngeal exudate or posterior oropharyngeal erythema  Eyes:      General: No scleral icterus  Right eye: No discharge  Left eye: No discharge  Extraocular Movements: Extraocular movements intact  Conjunctiva/sclera: Conjunctivae normal       Pupils: Pupils are equal, round, and reactive to light  Cardiovascular:      Rate and Rhythm: Normal rate and regular rhythm  Pulses: Normal pulses  Heart sounds: Murmur heard  No friction rub  No gallop  Pulmonary:      Effort: Pulmonary effort is normal  No respiratory distress  Breath sounds: Normal breath sounds  No wheezing, rhonchi or rales  Abdominal:      General: Bowel sounds are normal  There is no distension  Palpations: Abdomen is soft  There is no mass  Tenderness: There is no abdominal tenderness  There is no guarding  Musculoskeletal:         General: No tenderness or signs of injury  Normal range of motion  Cervical back: Normal range of motion and neck supple  No rigidity or tenderness  Left lower leg: No edema  Comments: Right leg prothesis   Lymphadenopathy:      Cervical: No cervical adenopathy  Skin:     General: Skin is warm and dry  Coloration: Skin is not jaundiced  Findings: No bruising, erythema or lesion  Neurological:      General: No focal deficit present  Mental Status: He is alert  Cranial Nerves: No cranial nerve deficit  Motor: No weakness  Coordination: Coordination normal       Gait: Gait abnormal       Comments: Alert and oriented to person, date, month, year and city  Unable to recall day of the week and place  Able to verbalize and follow direction  Slow gait related to right leg prothesis   Psychiatric:         Mood and Affect: Mood normal          Behavior: Behavior normal          Thought Content: Thought content normal          MoCA: 22/30  GDS:  1/15    Labs & Imaging:  Lab Results   Component Value Date    WBC 5 49 01/13/2022    HGB 13 0 01/13/2022    HCT 39 8 01/13/2022    MCV 92 01/13/2022     01/13/2022     Lab Results   Component Value Date    SODIUM 148 (H) 01/13/2022    K 3 8 01/13/2022     (H) 01/13/2022    CO2 28 01/13/2022    BUN 20 01/13/2022    CREATININE 0 76 01/13/2022    CALCIUM 9 1 01/13/2022     Lab Results   Component Value Date    USQKKETS08 227 03/11/2021     Lab Results   Component Value Date    TBJ1NCXCNBLR 2 342 01/13/2022     No results found for: [id number], [id number]   No results found for this or any previous visit

## 2022-01-12 NOTE — ASSESSMENT & PLAN NOTE
· [place] cognitive assessment score 22/30 today  Last visit in August 2021 the score was the same, 22/30  · Patient reports worsening short term memory  · Will consult ST for cognitive rehabilitation services  · Discussed dementia medications with both the patient and wife  They do not wish to start medications at this time due to risk of side effects    · Maintain a heart healthy diet  · Continue to do puzzles at home such as Sodoku  · Encourage physical and social activities as tolerated  · Manage chronic health conditions with PCP  · Follow-up in 6 months

## 2022-01-12 NOTE — PATIENT INSTRUCTIONS
1  Recommend cognitive rehabilitation therapy  2  Recommend right ear wax removal with PCP  3  Audiology consult made for hearing test  4  Recommend a heart healthy diet with [employer] service  5  Continue physical therapy  6  Stay mentally, socially and physically active  7  Manage your chronic conditions with PCP  8   Follow-up in 6 months

## 2022-01-12 NOTE — PROGRESS NOTES
[facility]  [address] , [address] [facility], [address] [place] [address]  [phone]    Social Work Intake    Zenon Rodríguez presents today for a geriatric assessment, accompanied by Dalia Dancer (Spouse)  [place] Cognitive Assessment (MoCA) Version 8 2  Education: 12 > years     Points Earned POSSIBLE Points   Visuospatial/Executive   Alternating [place] Making 1 1   Visuoconstructional skills 1 1   Visuoconstructional skills (clock) 3 3   Naming   Naming Animals 3 3   Attention   Digit Span 2 2   Vigilance (letters) 1 1   Serial 7 subtraction 3 3   Language   Sentence Repetition 2 2   Verbal fluency 1 1   Abstraction   Abstraction (word pairings) 2 2   Delayed recall   Delayed recall 0 5   Memory index score: /15   Orientation   Orientation 4 6   TOTAL SCORE: 22/30  (Normal ?26/30)   Additional notes: Pt was pleasant and spouse was present during [facility] assessment  Geriatric Depression Scale (GDS) completed today, 1/15

## 2022-01-13 ENCOUNTER — APPOINTMENT (OUTPATIENT)
Dept: LAB | Facility: HOSPITAL | Age: 82
End: 2022-01-13
Payer: MEDICARE

## 2022-01-13 ENCOUNTER — OFFICE VISIT (OUTPATIENT)
Dept: PHYSICAL THERAPY | Facility: CLINIC | Age: 82
End: 2022-01-13
Payer: MEDICARE

## 2022-01-13 DIAGNOSIS — Z00.00 HEALTH CARE MAINTENANCE: ICD-10-CM

## 2022-01-13 DIAGNOSIS — I10 ESSENTIAL (PRIMARY) HYPERTENSION: ICD-10-CM

## 2022-01-13 DIAGNOSIS — E78.49 OTHER HYPERLIPIDEMIA: ICD-10-CM

## 2022-01-13 DIAGNOSIS — Z11.59 NEED FOR HEPATITIS C SCREENING TEST: ICD-10-CM

## 2022-01-13 DIAGNOSIS — Z00.00 HEALTHCARE MAINTENANCE: ICD-10-CM

## 2022-01-13 DIAGNOSIS — R53.1 GENERALIZED WEAKNESS: Primary | ICD-10-CM

## 2022-01-13 LAB
ALBUMIN SERPL BCP-MCNC: 3.6 G/DL (ref 3.5–5)
ALP SERPL-CCNC: 57 U/L (ref 46–116)
ALT SERPL W P-5'-P-CCNC: 13 U/L (ref 12–78)
ANION GAP SERPL CALCULATED.3IONS-SCNC: 10 MMOL/L (ref 4–13)
AST SERPL W P-5'-P-CCNC: 14 U/L (ref 5–45)
BASOPHILS # BLD AUTO: 0.04 THOUSANDS/ΜL (ref 0–0.1)
BASOPHILS NFR BLD AUTO: 1 % (ref 0–1)
BILIRUB SERPL-MCNC: 0.54 MG/DL (ref 0.2–1)
BUN SERPL-MCNC: 20 MG/DL (ref 5–25)
CALCIUM SERPL-MCNC: 9.1 MG/DL (ref 8.3–10.1)
CHLORIDE SERPL-SCNC: 110 MMOL/L (ref 100–108)
CHOLEST SERPL-MCNC: 152 MG/DL
CO2 SERPL-SCNC: 28 MMOL/L (ref 21–32)
CREAT SERPL-MCNC: 0.76 MG/DL (ref 0.6–1.3)
EOSINOPHIL # BLD AUTO: 0.27 THOUSAND/ΜL (ref 0–0.61)
EOSINOPHIL NFR BLD AUTO: 5 % (ref 0–6)
ERYTHROCYTE [DISTWIDTH] IN BLOOD BY AUTOMATED COUNT: 13.2 % (ref 11.6–15.1)
GFR SERPL CREATININE-BSD FRML MDRD: 84 ML/MIN/1.73SQ M
GLUCOSE P FAST SERPL-MCNC: 88 MG/DL (ref 65–99)
HCT VFR BLD AUTO: 39.8 % (ref 36.5–49.3)
HDLC SERPL-MCNC: 63 MG/DL
HGB BLD-MCNC: 13 G/DL (ref 12–17)
IMM GRANULOCYTES # BLD AUTO: 0.02 THOUSAND/UL (ref 0–0.2)
IMM GRANULOCYTES NFR BLD AUTO: 0 % (ref 0–2)
LDLC SERPL CALC-MCNC: 82 MG/DL (ref 0–100)
LYMPHOCYTES # BLD AUTO: 1.38 THOUSANDS/ΜL (ref 0.6–4.47)
LYMPHOCYTES NFR BLD AUTO: 25 % (ref 14–44)
MCH RBC QN AUTO: 30.1 PG (ref 26.8–34.3)
MCHC RBC AUTO-ENTMCNC: 32.7 G/DL (ref 31.4–37.4)
MCV RBC AUTO: 92 FL (ref 82–98)
MONOCYTES # BLD AUTO: 0.57 THOUSAND/ΜL (ref 0.17–1.22)
MONOCYTES NFR BLD AUTO: 10 % (ref 4–12)
NEUTROPHILS # BLD AUTO: 3.21 THOUSANDS/ΜL (ref 1.85–7.62)
NEUTS SEG NFR BLD AUTO: 59 % (ref 43–75)
NONHDLC SERPL-MCNC: 89 MG/DL
NRBC BLD AUTO-RTO: 0 /100 WBCS
PLATELET # BLD AUTO: 240 THOUSANDS/UL (ref 149–390)
PMV BLD AUTO: 9.2 FL (ref 8.9–12.7)
POTASSIUM SERPL-SCNC: 3.8 MMOL/L (ref 3.5–5.3)
PROT SERPL-MCNC: 6.7 G/DL (ref 6.4–8.2)
RBC # BLD AUTO: 4.32 MILLION/UL (ref 3.88–5.62)
SODIUM SERPL-SCNC: 148 MMOL/L (ref 136–145)
TRIGL SERPL-MCNC: 33 MG/DL
TSH SERPL DL<=0.05 MIU/L-ACNC: 2.34 UIU/ML (ref 0.36–3.74)
WBC # BLD AUTO: 5.49 THOUSAND/UL (ref 4.31–10.16)

## 2022-01-13 PROCEDURE — 80053 COMPREHEN METABOLIC PANEL: CPT

## 2022-01-13 PROCEDURE — 36415 COLL VENOUS BLD VENIPUNCTURE: CPT

## 2022-01-13 PROCEDURE — 85025 COMPLETE CBC W/AUTO DIFF WBC: CPT

## 2022-01-13 PROCEDURE — 97110 THERAPEUTIC EXERCISES: CPT | Performed by: PHYSICAL THERAPIST

## 2022-01-13 PROCEDURE — 86803 HEPATITIS C AB TEST: CPT

## 2022-01-13 PROCEDURE — 80061 LIPID PANEL: CPT

## 2022-01-13 PROCEDURE — 84443 ASSAY THYROID STIM HORMONE: CPT

## 2022-01-13 PROCEDURE — 97530 THERAPEUTIC ACTIVITIES: CPT | Performed by: PHYSICAL THERAPIST

## 2022-01-13 NOTE — PROGRESS NOTES
Daily Note     Today's date: 2022  Patient name: Eliot Ratliff  : 1940  MRN: [id number]  Referring provider: MARIA A Irwin  Dx:   Encounter Diagnosis     ICD-10-CM    1  Generalized weakness  R53 1        Start Time: 1505  Stop Time: 1540  Total time in clinic (min): 35 minutes    Subjective: Pt denies any current pain, denies increase in symptoms after evaluation  Objective: See treatment diary below      Assessment: Pt able to begin formal treatment today with good tolerance and notable weakness/fatigue noted during mat table strengthening bilaterally with cues to ensure slow eccentric control during proximal hip strengthening  Introduced closed chain strengthening to include step ups with ability to progress box height to 12" on left, however due to asymmetrical knee joint on right due to leg length discrepancy had to minimize box height to 2"  Pt also limited with squat depth due to location of prosthetic and where right knee joint hinges so utilized low mat table + airex pad to help improve tolerance and allow equal contribution of both legs, however does bias left leg more  Pt also able to add static single leg balance with significant challenge, more so on right versus left, but able to complete without pain  All standing stability and strengthening exercises performed with CGA of therapist to ensure safety and proper exercise form  Will continue to progress as symptoms allow  Plan: Continue per plan of care  Progress treatment as tolerated         Diagnosis: Generalized weakness   Precautions: Dementia, HTN, Hx of prostate cancer, hx of right leg discrepancy and wears prothesis on right   Primary Goals: Improve strength/endurance    POC Expires: 3/8/22   Re-evaluation Date: 22    FOTO Scores/Date: Goal - 73   Visit Count 1/10 2/10      Manuals                                               Ther Ex       SLR  3x10 ea      SL hip abd  3x10 ea      Bridges  2x10 20#                                                      Neuro Re-Ed                                                                                                                       Ther Act           Sit to stand  To low mat + airex pad 2x10 - CGA      Step ups  12" on left; 2" on right 2x10 ea      lunges        Lateral band walk  grn 4 laps      SLS  Left 3x15"; right 5x5" finger tip assist - CGA for safety               Functional testing 5xSTS: 28 2" TU 9"           Pt education KD           Modalities

## 2022-01-14 ENCOUNTER — TELEPHONE (OUTPATIENT)
Dept: FAMILY MEDICINE CLINIC | Facility: CLINIC | Age: 82
End: 2022-01-14

## 2022-01-14 LAB — HCV AB SER QL: NORMAL

## 2022-01-14 NOTE — ASSESSMENT & PLAN NOTE
· Continue to increase fluid hydration and fiber intake  · If constipation continues, recommend a stool softner daily

## 2022-01-14 NOTE — ASSESSMENT & PLAN NOTE
· Continue physical therapy  · Maintain adequate PO food and fluid intake  · Fall precautions in the home advised

## 2022-01-14 NOTE — ASSESSMENT & PLAN NOTE
· BP currently stable and controlled with increase in Lisinopril to 10 mg mary  · Continue heart healthy diet and regular exercise as tolerated

## 2022-01-14 NOTE — TELEPHONE ENCOUNTER
----- Message from Daija 2 sent at 1/14/2022 11:19 AM EST -----  Please let patient know that his sodium is a little high  Decrease salt intake    All other blood work was normal

## 2022-01-17 ENCOUNTER — OFFICE VISIT (OUTPATIENT)
Dept: PHYSICAL THERAPY | Facility: CLINIC | Age: 82
End: 2022-01-17
Payer: MEDICARE

## 2022-01-17 DIAGNOSIS — R53.1 GENERALIZED WEAKNESS: Primary | ICD-10-CM

## 2022-01-17 PROCEDURE — 97110 THERAPEUTIC EXERCISES: CPT | Performed by: PHYSICAL THERAPIST

## 2022-01-17 PROCEDURE — 97530 THERAPEUTIC ACTIVITIES: CPT | Performed by: PHYSICAL THERAPIST

## 2022-01-17 NOTE — PROGRESS NOTES
Daily Note     Today's date: 2022  Patient name: Neda Ventura  : 1940  MRN: [id number]  Referring provider: MARIA A Flowers  Dx:   Encounter Diagnosis     ICD-10-CM    1  Generalized weakness  R53 1        Start Time:   Stop Time:   Total time in clinic (min): 40 minutes    Subjective: Pt denies any pain after last visit, just fatigue  Pt reports some soreness from shoveling snow earlier today  Objective: See treatment diary below      Assessment: Pt arrives with increased pain/soreness in his legs that he attributes to shoveling snow earlier today that impacted exercise tolerance with standing exercises  Pt was able to add weight with mat table hip strengthening for all planes on both legs but unable with flexion on left due to increased left thigh pain but improves when performed without weight against gravity - pt remains reliant on cues for maintaining full knee extension during completion and for slow eccentric control  Pt had to regress box height with forward step ups on left from 12" to 8" to improve quality of movement and minimize UE use for assistance  Added 4-way resisted walking with significant difficulty due to decreased strength and right leg stability with CGA to min A  HEP was updated and reviewed  Plan: Continue per plan of care  Progress treatment as tolerated         Diagnosis: Generalized weakness   Precautions: Dementia, HTN, Hx of prostate cancer, hx of right leg discrepancy and wears prothesis on right   Primary Goals: Improve strength/endurance    POC Expires: 3/8/22   Re-evaluation Date: 22    FOTO Scores/Date: Goal - 73   Visit Count 1/10 2/10 3/10     Manuals                                              Ther Ex      SLR  3x10 ea 3x10 ea 0# left, 1# right     SL hip abd  3x10 ea 3x10 ea 1# left, 1# right     Bridges  2x10 20# 2x10 20#                                                     Neuro Re-Ed Ther Act           Sit to stand  To low mat + airex pad 2x10 - CGA To low mat + airex pad 2x10 - CGA     Step ups  12" on left; 2" on right 2x10 ea 8" on left; 2" on right 2x10 ea     lunges        Lateral band walk  grn 4 laps grn 4 laps     SLS  Left 3x15"; right 5x5" finger tip assist - CGA for safety Left 3x15"; right 5x5" finger tip assist - CGA for safety     Resisted walking 4-way   2 laps ea; 10#      Functional testing 5xSTS: 28 2" TU 9"           Pt education KD           Modalities

## 2022-01-19 ENCOUNTER — TELEPHONE (OUTPATIENT)
Dept: FAMILY MEDICINE CLINIC | Facility: CLINIC | Age: 82
End: 2022-01-19

## 2022-01-19 ENCOUNTER — OFFICE VISIT (OUTPATIENT)
Dept: FAMILY MEDICINE CLINIC | Facility: CLINIC | Age: 82
End: 2022-01-19
Payer: MEDICARE

## 2022-01-19 VITALS
HEIGHT: 65 IN | WEIGHT: 142 LBS | SYSTOLIC BLOOD PRESSURE: 120 MMHG | BODY MASS INDEX: 23.66 KG/M2 | OXYGEN SATURATION: 96 % | DIASTOLIC BLOOD PRESSURE: 68 MMHG | HEART RATE: 69 BPM | TEMPERATURE: 97 F

## 2022-01-19 DIAGNOSIS — I10 ESSENTIAL (PRIMARY) HYPERTENSION: ICD-10-CM

## 2022-01-19 DIAGNOSIS — H61.23 BILATERAL IMPACTED CERUMEN: Primary | ICD-10-CM

## 2022-01-19 PROCEDURE — 99214 OFFICE O/P EST MOD 30 MIN: CPT | Performed by: NURSE PRACTITIONER

## 2022-01-19 PROCEDURE — 69210 REMOVE IMPACTED EAR WAX UNI: CPT | Performed by: NURSE PRACTITIONER

## 2022-01-19 NOTE — PROGRESS NOTES
Assessment/Plan:     Bilateral impacted cerumen  Patient has bilateral cerumen impaction  When for hearing exam and could not have it completed  Procedure completed  Earwax removed  Education provided  No immediate complication  Essential (primary) hypertension  Patient brought in blood pressure readings  Blood pressure is stable  130s over 80s average  Continue medication  Problem List Items Addressed This Visit        Cardiovascular and Mediastinum    Essential (primary) hypertension     Patient brought in blood pressure readings  Blood pressure is stable  130s over 80s average  Continue medication  Nervous and Auditory    Bilateral impacted cerumen - Primary     Patient has bilateral cerumen impaction  When for hearing exam and could not have it completed  Procedure completed  Earwax removed  Education provided  No immediate complication  Subjective:      Patient ID: Son Galan is a 79 y o  male  Patient is being seen with complaints of bilateral ear wax impaction  Went for a hearing test and was unable to complete because of wax in both ears      The following portions of the patient's history were reviewed and updated as appropriate: allergies, current medications, past family history, past medical history, past social history, past surgical history and problem list     Review of Systems   Constitutional: Negative  HENT: Positive for ear pain (discomfort)  Eyes: Negative  Respiratory: Negative  Cardiovascular: Negative  Gastrointestinal: Negative  Endocrine: Negative  Genitourinary: Negative  Musculoskeletal: Negative  Skin: Negative  Allergic/Immunologic: Negative  Neurological: Negative  Hematological: Negative  Psychiatric/Behavioral: Negative            Objective:      /68   Pulse 69   Temp (!) 97 °F (36 1 °C)   Ht 5' 4 5" (1 638 m)   Wt 64 4 kg (142 lb)   SpO2 96%   BMI 24 00 kg/m² Physical Exam  Vitals and nursing note reviewed  Constitutional:       Appearance: Normal appearance  He is well-developed  HENT:      Right Ear: There is impacted cerumen  Left Ear: There is impacted cerumen  Cardiovascular:      Rate and Rhythm: Normal rate and regular rhythm  Pulses: Normal pulses  Heart sounds: Normal heart sounds  Pulmonary:      Effort: Pulmonary effort is normal       Breath sounds: Normal breath sounds  Abdominal:      Palpations: Abdomen is soft  Musculoskeletal:         General: Normal range of motion  Skin:     General: Skin is warm and dry  Neurological:      Mental Status: He is alert and oriented to person, place, and time  Psychiatric:         Attention and Perception: Attention and perception normal          Mood and Affect: Mood normal          Speech: Speech normal          Behavior: Behavior normal          Thought Content: Thought content normal          Cognition and Memory: He exhibits impaired recent memory           Judgment: Judgment normal            Labs:    Lab Results   Component Value Date    WBC 5 49 01/13/2022    HGB 13 0 01/13/2022    HCT 39 8 01/13/2022    MCV 92 01/13/2022     01/13/2022     Lab Results   Component Value Date     09/25/2015    K 3 8 01/13/2022     (H) 01/13/2022    CO2 28 01/13/2022    ANIONGAP 4 09/25/2015    BUN 20 01/13/2022    CREATININE 0 76 01/13/2022    GLUCOSE 85 09/25/2015    GLUF 88 01/13/2022    CALCIUM 9 1 01/13/2022    AST 14 01/13/2022    ALT 13 01/13/2022    ALKPHOS 57 01/13/2022    PROT 7 2 09/25/2015    BILITOT 0 55 09/25/2015    EGFR 84 01/13/2022     Lab Results   Component Value Date    GLUCOSE 85 09/25/2015    CALCIUM 9 1 01/13/2022     09/25/2015    K 3 8 01/13/2022    CO2 28 01/13/2022     (H) 01/13/2022    BUN 20 01/13/2022    CREATININE 0 76 01/13/2022     Ear cerumen removal    Date/Time: 1/19/2022 10:15 AM  Performed by: MARIA A Fierro  Authorized by: Clarissa Babinski MARIA A Guillen   Universal Protocol:  Consent: Verbal consent obtained  Consent given by: patient  Patient understanding: patient states understanding of the procedure being performed  Patient consent: the patient's understanding of the procedure matches consent given      Patient location:  Clinic  Procedure details:     Local anesthetic:  None    Location:  L ear and R ear    Procedure type: curette      Approach:  External  Post-procedure details:     Complication:  None    Hearing quality:  Normal    Patient tolerance of procedure:   Tolerated well, no immediate complications

## 2022-01-19 NOTE — PATIENT INSTRUCTIONS
Cerumen Impaction   WHAT YOU NEED TO KNOW:   Cerumen impaction is the blockage of the outer ear canal by tightly packed cerumen (earwax)  It is generally treated with procedures such as flushing or suctioning the ear canal or the use of instruments to remove the impaction  DISCHARGE INSTRUCTIONS:   Medicines:  · Ear drops: These are used to soften the wax in your ear  Wax softening ear drops may be bought without a prescription  Ask your healthcare provider how often you should use this medicine  Read the instructions carefully before you use the ear drops  Do the following when you put in ear drops:     ? Warm the drops by holding the bottle in your hands for a few minutes  Cold ear drops may make you dizzy  ? Lie down with the affected ear toward the ceiling  You may also stand with your head tilted to one side  ? Pull your ear lobe up and back, and place the correct number of drops into the ear  ? Keep your ear facing up for 5 to 10 minutes so the drops coat the outer ear canal      ? Gently clean the outer part of the ear with a cotton swab  Do not  place the cotton swab or anything inside your ear canal  This increases the risk of damaging your eardrum  · Take your medicine as directed  Contact your healthcare provider if you think your medicine is not helping or if you have side effects  Tell him of her if you are allergic to any medicine  Keep a list of the medicines, vitamins, and herbs you take  Include the amounts, and when and why you take them  Bring the list or the pill bottles to follow-up visits  Carry your medicine list with you in case of an emergency  Follow up with your healthcare provider as directed:  Write down your questions so you remember to ask them during your visits  Contact your healthcare provider if:   · You have a fever  · You have trouble hearing or ringing in your ear  · You have questions about your condition or care      Return to the emergency department if:   · You feel dizzy  · You have discharge or blood coming out of your ear  · Your ear pain does not go away or gets worse  © Copyright [employer] 2018 Information is for End User's use only and may not be sold, redistributed or otherwise used for commercial purposes  All illustrations and images included in CareNotes® are the copyrighted property of A D A M , Inc  or [facility] Yaneli Loving   The above information is an  only  It is not intended as medical advice for individual conditions or treatments  Talk to your doctor, nurse or pharmacist before following any medical regimen to see if it is safe and effective for you

## 2022-01-19 NOTE — ASSESSMENT & PLAN NOTE
Patient has bilateral cerumen impaction  When for hearing exam and could not have it completed  Procedure completed  Earwax removed  Education provided  No immediate complication

## 2022-01-20 ENCOUNTER — OFFICE VISIT (OUTPATIENT)
Dept: PHYSICAL THERAPY | Facility: CLINIC | Age: 82
End: 2022-01-20
Payer: MEDICARE

## 2022-01-20 DIAGNOSIS — R53.1 GENERALIZED WEAKNESS: Primary | ICD-10-CM

## 2022-01-20 PROCEDURE — 97530 THERAPEUTIC ACTIVITIES: CPT | Performed by: PHYSICAL THERAPIST

## 2022-01-20 PROCEDURE — 97110 THERAPEUTIC EXERCISES: CPT | Performed by: PHYSICAL THERAPIST

## 2022-01-20 NOTE — PROGRESS NOTES
Daily Note     Today's date: 2022  Patient name: Son Galan  : 1940  MRN: [id number]  Referring provider: MARIA A Polo  Dx:   Encounter Diagnosis     ICD-10-CM    1  Generalized weakness  R53 1                   Subjective: Pt w/o any complaints to report upon arrival to tx session  Objective: See treatment diary below      Assessment: Modified SLR flex and abduction to be performed w/o weight today due to pain in L inner thigh region - pt states that he did not have this pain earlier today and feels it may be something that was aggravated while he was cleaning his garage today  Improved comfort when pt ER'd L hip  Remainder of ex tolerated well aside from fatigue reported and soreness in "muscles I haven't used"  Attempted to show pt seated adductor stretch at the end of tx session to address inner thigh discomfort, but pt then stated that his pain was in lumbosacral area w/ mat TE instead  Briefly instructed pt in LTR instead and instructed pt to use CP at home if needed  Plan: Continue per plan of care  Progress treatment as tolerated         Diagnosis: Generalized weakness   Precautions: Dementia, HTN, Hx of prostate cancer, hx of right leg discrepancy and wears prothesis on right   Primary Goals: Improve strength/endurance    POC Expires: 3/8/22   Re-evaluation Date: 22    FOTO Scores/Date: Goal - 73   Visit Count 1/10 2/10 3/10     Manuals                                             Ther Ex      SLR  3x10 ea 3x10 ea 0# left, 1# right 3x10 ea 0# left w/ hip ER'd, 0# right    SL hip abd  3x10 ea 3x10 ea 1# left, 1# right 3x10 ea 0# b/l     Bridges  2x10 20# 2x10 20# 2x10 20#                                                    Neuro Re-Ed                                                                                                                       Ther Act           Sit to stand  To low mat + airex pad 2x10 - CGA To low mat + airex pad 2x10 - CGA     Step ups  12" on left; 2" on right 2x10 ea 8" on left; 2" on right 2x10 ea 8" on left ; 2" on R 2x10    lunges        Lateral band walk  grn 4 laps grn 4 laps grn 4 laps    SLS  Left 3x15"; right 5x5" finger tip assist - CGA for safety Left 3x15"; right 5x5" finger tip assist - CGA for safety Left 3x15"; right 5x5" finger tip assist - CGA for safety    Resisted walking 4-way   2 laps ea; 10#      Functional testing 5xSTS: 28 2" TU 9"           Pt education KD           Modalities

## 2022-01-24 ENCOUNTER — OFFICE VISIT (OUTPATIENT)
Dept: PHYSICAL THERAPY | Facility: CLINIC | Age: 82
End: 2022-01-24
Payer: MEDICARE

## 2022-01-24 DIAGNOSIS — R53.1 GENERALIZED WEAKNESS: Primary | ICD-10-CM

## 2022-01-24 PROCEDURE — 97530 THERAPEUTIC ACTIVITIES: CPT | Performed by: PHYSICAL THERAPIST

## 2022-01-24 PROCEDURE — 97110 THERAPEUTIC EXERCISES: CPT | Performed by: PHYSICAL THERAPIST

## 2022-01-24 NOTE — PROGRESS NOTES
Daily Note     Today's date: 2022  Patient name: Salina Forrest  : 1940  MRN: [id number]  Referring provider: MARIA A Bautista  Dx:   Encounter Diagnosis     ICD-10-CM    1  Generalized weakness  R53 1        Start Time: 160  Stop Time:   Total time in clinic (min): 39 minutes    Subjective: Pt denies any increase in pain after last visit or today, feels his strength and balance is coming along since enrolling in therapy  Objective: See treatment diary below      Assessment: Pt was able to load proximal hip strength on mat table with 1# with cues for slow eccentric control but no onset of pain, only fatigue after completion  Pt remains reliant on cues during closed chain strengthening for proper form with sit to stands and step ups with extensive cues needed during step ups for proper sequencing of movement and slow eccentric control but was able to increase box height on right to 4"  Pt remains with significant challenge to stability with resisted walking and SLS with CGA to min A for safety but improved control compared to last visit  Will continue to progress as symptoms allow  Plan: Continue per plan of care  Progress treatment as tolerated         Diagnosis: Generalized weakness   Precautions: Dementia, HTN, Hx of prostate cancer, hx of right leg discrepancy and wears prothesis on right   Primary Goals: Improve strength/endurance    POC Expires: 3/8/22   Re-evaluation Date: 22    FOTO Scores/Date: Goal - 73   Visit Count 1/10 2/10 3/10 4/10 5/10   Manuals                                            Ther Ex    SLR  3x10 ea 3x10 ea 0# left, 1# right 3x10 ea 0# left w/ hip ER'd, 0# right 3x10 ea 1#   SL hip abd  3x10 ea 3x10 ea 1# left, 1# right 3x10 ea 0# b/l  3x10 ea 1#   Bridges  2x10 20# 2x10 20# 2x10 20# 2x10 20#                                                   Neuro Re-Ed Ther Act     Sit to stand  To low mat + airex pad 2x10 - CGA To low mat + airex pad 2x10 - CGA  To low mat + airex pad 2x10 - CGA   Step ups  12" on left; 2" on right 2x10 ea 8" on left; 2" on right 2x10 ea 8" on left ; 2" on R 2x10 8" on left; 4" on right 2x10 ea   lunges        Lateral band walk  grn 4 laps grn 4 laps grn 4 laps grn 4 laps   SLS  Left 3x15"; right 5x5" finger tip assist - CGA for safety Left 3x15"; right 5x5" finger tip assist - CGA for safety Left 3x15"; right 5x5" finger tip assist - CGA for safety Left 3x15"; right 5x5" finger tip assist - CGA for safety   Resisted walking 4-way   2 laps ea; 10#  2 laps ea; 10#    Functional testing 5xSTS: 28 2" TU 9"           Pt education KD           Modalities

## 2022-01-27 ENCOUNTER — OFFICE VISIT (OUTPATIENT)
Dept: PHYSICAL THERAPY | Facility: CLINIC | Age: 82
End: 2022-01-27
Payer: MEDICARE

## 2022-01-27 DIAGNOSIS — R53.1 GENERALIZED WEAKNESS: Primary | ICD-10-CM

## 2022-01-27 PROCEDURE — 97530 THERAPEUTIC ACTIVITIES: CPT | Performed by: PHYSICAL THERAPIST

## 2022-01-27 PROCEDURE — 97110 THERAPEUTIC EXERCISES: CPT | Performed by: PHYSICAL THERAPIST

## 2022-01-27 NOTE — PROGRESS NOTES
Daily Note     Today's date: 2022  Patient name: Faith Gay  : 1940  MRN: [id number]  Referring provider: MARIA A Myrick  Dx:   Encounter Diagnosis     ICD-10-CM    1  Generalized weakness  R53 1        Start Time: 0  Stop Time:   Total time in clinic (min): 45 minutes    Subjective: Pt denies any increase in pain after last visit, just fatigue  Pt continues to struggle with balance, particularly on his right due to prosthesis  Pt reports improving strength since starting with therapy  Objective: See treatment diary below      Assessment: Pt continues to progress well with mat table strengthening and improved quality of movement but remains easily fatigued after each set  Pt with improved form bilaterally with step ups and less reliant on UE assist and vaulting from contralateral leg  Pt remains most challenged with resisted walking due to weakness and stability, notably while on right leg as stance leg and moving left leg as he remains very limited with balance on right side due to the nature of his leg length discrepancy and prosthesis  Added tandem walking with good challenge and improved stability but CGA and HHA used for pt safety  FOTO was re-assessed today and found improvement of 73 to 74 since initial evaluation  Plan: Continue per plan of care  Progress treatment as tolerated         Diagnosis: Generalized weakness   Precautions: Dementia, HTN, Hx of prostate cancer, hx of right leg discrepancy and wears prothesis on right   Primary Goals: Improve strength/endurance    POC Expires: 3/8/22   Re-evaluation Date: 22    FOTO Scores/Date: Goal - 73; 22 - 74   Visit Count 6/10 2/10 3/10 4/10 5/10   Manuals                                            Ther Ex    SLR 3x10 ea 1# 3x10 ea 3x10 ea 0# left, 1# right 3x10 ea 0# left w/ hip ER'd, 0# right 3x10 ea 1#   SL hip abd 3x10 ea 1# 3x10 ea 3x10 ea 1# left, 1# right 3x10 ea 0# b/l  3x10 ea 1#   Bridges 2x10 20# 2x10 20# 2x10 20# 2x10 20# 2x10 20#   Tandem walk Fwd/back 3 laps - CGA                                        FOTO 74       Neuro Re-Ed                                                                                                                       Ther Act  1/27 1/13 1/24   Sit to stand To low mat + airex pad 2x10 - CGA To low mat + airex pad 2x10 - CGA To low mat + airex pad 2x10 - CGA  To low mat + airex pad 2x10 - CGA   Step ups 8" on left; 4" on right 2x10 ea 12" on left; 2" on right 2x10 ea 8" on left; 2" on right 2x10 ea 8" on left ; 2" on R 2x10 8" on left; 4" on right 2x10 ea   lunges        Lateral band walk grn 4 laps grn 4 laps grn 4 laps grn 4 laps grn 4 laps   SLS Left 3x15"; right 5x5" finger tip assist - CGA for safety Left 3x15"; right 5x5" finger tip assist - CGA for safety Left 3x15"; right 5x5" finger tip assist - CGA for safety Left 3x15"; right 5x5" finger tip assist - CGA for safety Left 3x15"; right 5x5" finger tip assist - CGA for safety   Resisted walking 4-way 3 laps ea; 10#  2 laps ea; 10#  2 laps ea; 10#                                    Functional testing            Pt education            Modalities

## 2022-01-28 ENCOUNTER — TELEPHONE (OUTPATIENT)
Dept: GERIATRICS | Age: 82
End: 2022-01-28

## 2022-01-28 NOTE — TELEPHONE ENCOUNTER
Pt wife called, needed help scheduling appt with the Audiology       Appt has been scheduled with Audiology for 2/18/2022 @11am

## 2022-01-31 ENCOUNTER — APPOINTMENT (OUTPATIENT)
Dept: PHYSICAL THERAPY | Facility: CLINIC | Age: 82
End: 2022-01-31
Payer: MEDICARE

## 2022-02-01 ENCOUNTER — OFFICE VISIT (OUTPATIENT)
Dept: PHYSICAL THERAPY | Facility: CLINIC | Age: 82
End: 2022-02-01
Payer: MEDICARE

## 2022-02-01 DIAGNOSIS — R53.1 GENERALIZED WEAKNESS: Primary | ICD-10-CM

## 2022-02-01 PROCEDURE — 97530 THERAPEUTIC ACTIVITIES: CPT | Performed by: PHYSICAL THERAPIST

## 2022-02-01 PROCEDURE — 97110 THERAPEUTIC EXERCISES: CPT | Performed by: PHYSICAL THERAPIST

## 2022-02-01 NOTE — PROGRESS NOTES
Daily Note     Today's date: 2022  Patient name: Son Galan  : 1940  MRN: [id number]  Referring provider: MARIA A Polo  Dx:   Encounter Diagnosis     ICD-10-CM    1  Generalized weakness  R53 1        Start Time:   Stop Time: 180  Total time in clinic (min): 44 minutes    Subjective: Pt feels his strength and stability is improving since starting with therapy, still feels more unsteady on right leg due to prosthesis       Objective: See treatment diary below      Assessment: Pt continues to progress well with mat table strength without increase in pain symptoms with cues for eccentric control and avoidance of anterior migration of leg or posterior rocking of hips during SL hip abduction  Pt continues to be limited with dynamic stability exercises but slowly improving each visit with continued reliance on CGA to min A for safety with resisted walking 4-ways and tandem walk  Pt with improved ease of movement during sit to stands and step ups with occasional cues required for correction of sequencing during step ups  Added lateral ciara step overs with increased stability challenge and limited ability to fully clear ciara on right due to location of knee hinge from prosthetic device so allowed circumduction of right leg during completion, again utilizing CGA for safety  HEP was updated and reviewed  Plan: Continue per plan of care  Progress treatment as tolerated         Diagnosis: Generalized weakness   Precautions: Dementia, HTN, Hx of prostate cancer, hx of right leg discrepancy and wears prothesis on right   Primary Goals: Improve strength/endurance    POC Expires: 3/8/22   Re-evaluation Date: 22    FOTO Scores/Date: Goal - 73; 22 - 74   Visit Count 6/10 7/10 3/10 4/10 5/10   Manuals                                            Ther Ex    SLR 3x10 ea 1# 3x10 ea 2# 3x10 ea 0# left, 1# right 3x10 ea 0# left w/ hip ER'd, 0# right 3x10 ea 1#   SL hip abd 3x10 ea 1# 3x10 ea 2# 3x10 ea 1# left, 1# right 3x10 ea 0# b/l  3x10 ea 1#   Bridges 2x10 20# 2x10 20# 2x10 20# 2x10 20# 2x10 20#   Tandem walk Fwd/back 3 laps - CGA Fwd/back 3 laps - CGA      Suad step over  Lateral 2 laps - CGA                               FOTO 74       Neuro Re-Ed                                                                                                                       Ther Act  1/27 2/1 1/24   Sit to stand To low mat + airex pad 2x10 - CGA To low mat + airex pad 2x10 - CGA To low mat + airex pad 2x10 - CGA  To low mat + airex pad 2x10 - CGA   Step ups 8" on left; 4" on right 2x10 ea 8" on left; 2" on right 2x10 ea 8" on left; 2" on right 2x10 ea 8" on left ; 2" on R 2x10 8" on left; 4" on right 2x10 ea   lunges        Lateral band walk grn 4 laps grn 4 laps grn 4 laps grn 4 laps grn 4 laps   SLS Left 3x15"; right 5x5" finger tip assist - CGA for safety Left 3x15"; right 5x5" finger tip assist - CGA for safety Left 3x15"; right 5x5" finger tip assist - CGA for safety Left 3x15"; right 5x5" finger tip assist - CGA for safety Left 3x15"; right 5x5" finger tip assist - CGA for safety   Resisted walking 4-way 3 laps ea; 10# 3 laps ea; 10# 2 laps ea; 10#  2 laps ea; 10#                                    Functional testing            Pt education            Modalities

## 2022-02-03 ENCOUNTER — APPOINTMENT (OUTPATIENT)
Dept: PHYSICAL THERAPY | Facility: CLINIC | Age: 82
End: 2022-02-03
Payer: MEDICARE

## 2022-02-10 ENCOUNTER — OFFICE VISIT (OUTPATIENT)
Dept: PHYSICAL THERAPY | Facility: CLINIC | Age: 82
End: 2022-02-10
Payer: MEDICARE

## 2022-02-10 DIAGNOSIS — R53.1 GENERALIZED WEAKNESS: Primary | ICD-10-CM

## 2022-02-10 PROCEDURE — 97110 THERAPEUTIC EXERCISES: CPT | Performed by: PHYSICAL THERAPIST

## 2022-02-10 NOTE — PROGRESS NOTES
PT Re-Evaluation     Today's date: 2/10/2022  Patient name: Salina Forrest  : 1940  MRN: [id number]  Referring provider: MARIA A Bautista  Dx:   Encounter Diagnosis     ICD-10-CM    1  Generalized weakness  R53 1        Start Time: 1720  Stop Time: 174  Total time in clinic (min): 26 minutes    Assessment  Assessment details: Patient is a 79 y o  male with generalized LE weakness and has completed 8 visits to date with improved FOTO score of 73 to 80 since initial evaluation  Patient demonstrates notable subjective/objective improvement since enrolling in PT to include improved LE strength, functional strength evidenced by 5xSTS, and improved balance, however continues to exhibit lingering deficits walking tolerance, muscular strength/endurance, and stability that continues to impact tolerance/ability to perform ADLs and recreational activities  Pt does have right prosthetic device that also continues to impact exercise progressions and provides increased challenge with overall gait speed and balance  Patient will benefit from continued skilled PT intervention to address the aforementioned impairments, achieve goals, maximize function, and improve quality of life  Pt is in agreement with this plan           Impairments: abnormal gait, activity intolerance, impaired balance, impaired physical strength, lacks appropriate home exercise program and safety issue  Understanding of Dx/Px/POC: good   Prognosis: good    Goals  ST weeks  Pt will demonstrate good understanding and compliance with HEP  MET  Pt will ambulate with least restrictive AD with adequate step-through gait mechanics including proper toe clearance during mid-swing phase of gait to reduce fall risk MET    LT weeks  Pt will improve FOTO score to > or = to 83 to indicate improved functional abilities UPDATED  Pt will increase bilateral hip/knee strength to 5/5 to allow for improved ability to squat, negotiate stairs, and for community ambulation UPDATED  Pt will increase 5xSTS time to 18" without reliance on UE assistance and good eccentric control PROGRESSING  Pt will improve TUG time to 10" to indicate improved functional strength, gait speed, and safety awareness PROGRESSING          Plan  Plan details: Cont to treat per POC  Patient would benefit from: skilled physical therapy  Planned therapy interventions: ADL training, body mechanics training, manual therapy, neuromuscular re-education, patient education, strengthening, stretching, therapeutic activities, therapeutic exercise, home exercise program, graded exercise, graded activity, gait training, functional ROM exercises, flexibility and balance  Frequency: 2x week  Duration in weeks: 8  Plan of Care beginning date: 2022  Plan of Care expiration date: 3/8/2022  Treatment plan discussed with: patient        Subjective Evaluation    History of Present Illness  Mechanism of injury: Pt feels he doing better since enrolling in therapy and that strength is progressing well with reports of 80% improvement since enrolling in therapy  Pt finds he is able to do more activities throughout the day with less fatigue and reliance on rest breaks  Pt would like to further improve upon his walking endurance/tolerance and general leg strength and balance     Pain  No pain reported    Patient Goals  Patient goals for therapy: independence with ADLs/IADLs, increased strength, improved balance and return to sport/leisure activities          Objective     Functional Assessment        Comments  Pt ambulates unassisted with step to gait; prosthesis donned on right secondary to significant leg length discrepancy      Bilateral hip strength globally 4/5     Left Knee MMT: Flex 5/5 Ext 5/5  Right Knee MMT: Flex 4+/5 Ext 4+/5    UE strength globally 5-/5     5xSTS: 19 1" with 1 airex pad and 1 UE for assist, fair ecc control with heavy LLE emphasis due to right leg length discrepancy    TU" from chair w/ 1 airex pad and 1 UE assist, walking unassisted              Diagnosis: Generalized weakness   Precautions: Dementia, HTN, Hx of prostate cancer, hx of right leg discrepancy and wears prothesis on right   Primary Goals: Improve strength/endurance    POC Expires: 3/8/22   Re-evaluation Date: 3/8/22    FOTO Scores/Date: Goal - 73; 1/27/22 - 74; 2/10/22 - 80   Visit Count 6/10 7/10 1/10 4/10 5/10   Manuals 1/27 2/1 2/10 1/20 1/24                                           Ther Ex 1/27 2/1 2/10  1/24   SLR 3x10 ea 1# 3x10 ea 2#  3x10 ea 0# left w/ hip ER'd, 0# right 3x10 ea 1#   SL hip abd 3x10 ea 1# 3x10 ea 2#  3x10 ea 0# b/l  3x10 ea 1#   Bridges 2x10 20# 2x10 20#  2x10 20# 2x10 20#   Tandem walk Fwd/back 3 laps - CGA Fwd/back 3 laps - CGA      Suad step over  Lateral 2 laps - CGA                 Re-assessed LE and functional tests;  Re-assessed FOTO - 80; Updated and reviewed PT prognosis/POC and goals              FOTO 74       Neuro Re-Ed                                                                                                                       Ther Act  1/27 2/1 1/24   Sit to stand To low mat + airex pad 2x10 - CGA To low mat + airex pad 2x10 - CGA   To low mat + airex pad 2x10 - CGA   Step ups 8" on left; 4" on right 2x10 ea 8" on left; 2" on right 2x10 ea  8" on left ; 2" on R 2x10 8" on left; 4" on right 2x10 ea   lunges        Lateral band walk grn 4 laps grn 4 laps  grn 4 laps grn 4 laps   SLS Left 3x15"; right 5x5" finger tip assist - CGA for safety Left 3x15"; right 5x5" finger tip assist - CGA for safety  Left 3x15"; right 5x5" finger tip assist - CGA for safety Left 3x15"; right 5x5" finger tip assist - CGA for safety   Resisted walking 4-way 3 laps ea; 10# 3 laps ea; 10#   2 laps ea; 10#                                    Functional testing            Pt education            Modalities

## 2022-02-14 ENCOUNTER — OFFICE VISIT (OUTPATIENT)
Dept: PHYSICAL THERAPY | Facility: CLINIC | Age: 82
End: 2022-02-14
Payer: MEDICARE

## 2022-02-14 DIAGNOSIS — R53.1 GENERALIZED WEAKNESS: Primary | ICD-10-CM

## 2022-02-14 PROCEDURE — 97530 THERAPEUTIC ACTIVITIES: CPT | Performed by: PHYSICAL THERAPIST

## 2022-02-14 NOTE — PROGRESS NOTES
Daily Note     Today's date: 2022  Patient name: Yanely Earl  : 1940  MRN: [id number]  Referring provider: MARIA A Esquivel  Dx:   Encounter Diagnosis     ICD-10-CM    1  Generalized weakness  R53 1        Start Time: 1235  Stop Time: 1317  Total time in clinic (min): 42 minutes    Subjective: Pt denies any pain, feels overall strength/stability is improving since enrolling in therapy      Objective: See treatment diary below      Assessment: Pt continues to progress well with mat table strengthening with intermittent cues required for correction of form and sequencing  Pt remains easily challenged with dynamic stability exercises when reliant on balancing on right leg as observed during resisted walking 4-way with use of CGA for safety but improved stability and control since introducing exercise  Pt easily fatigued with step ups and sit to stand with cues to minimize reliance on UE assist during concentric phase and for slow eccentric lowering with increased difficulty with step ups noted on right and only able to perform from 4" box but more impacted from prosthetic device and assymmetrical hinge point of knee  HEP was updated and reviewed  Plan: Continue per plan of care  Progress treatment as tolerated         Diagnosis: Generalized weakness   Precautions: Dementia, HTN, Hx of prostate cancer, hx of right leg discrepancy and wears prothesis on right   Primary Goals: Improve strength/endurance    POC Expires: 3/8/22   Re-evaluation Date: 3/8/22    FOTO Scores/Date: Goal - 73; 22 - 74; 2/10/22 - 80   Visit Count 610 7/10 1/10 210 5/10   Manuals 1/27 2/1 2/10 2/14 1/24                                           Ther Ex 1/27 2/1 2/10 2/14 1/24   SLR 3x10 ea 1# 3x10 ea 2#  3x10 ea 2# 3x10 ea 1#   SL hip abd 3x10 ea 1# 3x10 ea 2#  3x10 ea 2# 3x10 ea 1#   Bridges 2x10 20# 2x10 20#  [id number]" 20# 2x10 20#   Tandem walk Fwd/back 3 laps - CGA Fwd/back 3 laps - CGA      Suda step over  Lateral 2 laps - CGA                 Re-assessed LE and functional tests;  Re-assessed FOTO - 80; Updated and reviewed PT prognosis/POC and goals              FOTO 74       Neuro Re-Ed                                                                                                                       Ther Act  1/27 2/1 1/24   Sit to stand To low mat + airex pad 2x10 - CGA To low mat + airex pad 2x10 - CGA  To low mat + airex pad 2x10 - CGA To low mat + airex pad 2x10 - CGA   Step ups 8" on left; 4" on right 2x10 ea 8" on left; 2" on right 2x10 ea  8" on left ; 4" on R 2x10 8" on left; 4" on right 2x10 ea   lunges        Lateral band walk grn 4 laps grn 4 laps  Blue 4 laps grn 4 laps   SLS Left 3x15"; right 5x5" finger tip assist - CGA for safety Left 3x15"; right 5x5" finger tip assist - CGA for safety  Left 3x15"; right 5x5" finger tip assist - CGA for safety Left 3x15"; right 5x5" finger tip assist - CGA for safety   Resisted walking 4-way 3 laps ea; 10# 3 laps ea; 10#  3 laps ea 10# - CGA 2 laps ea; 10#                                    Functional testing            Pt education            Modalities

## 2022-02-17 ENCOUNTER — OFFICE VISIT (OUTPATIENT)
Dept: PHYSICAL THERAPY | Facility: CLINIC | Age: 82
End: 2022-02-17
Payer: MEDICARE

## 2022-02-17 DIAGNOSIS — R53.1 GENERALIZED WEAKNESS: Primary | ICD-10-CM

## 2022-02-17 PROCEDURE — 97530 THERAPEUTIC ACTIVITIES: CPT | Performed by: PHYSICAL THERAPIST

## 2022-02-17 PROCEDURE — 97110 THERAPEUTIC EXERCISES: CPT | Performed by: PHYSICAL THERAPIST

## 2022-02-17 NOTE — PROGRESS NOTES
Daily Note     Today's date: 2022  Patient name: Bridger Sainz  : 1940  MRN: [id number]  Referring provider: MARIA A Kong  Dx:   Encounter Diagnosis     ICD-10-CM    1  Generalized weakness  R53 1        Start Time: 1230  Stop Time: 1315  Total time in clinic (min): 45 minutes    Subjective: Pt denies any pain, feels he continues to improve with strength and balance      Objective: See treatment diary below      Assessment: Pt continues to progress well with mat table strengthening with ability to increase weight with all exercises without compromising form or pain, however occasional cues to avoid posterior rocking of hips during SL hip abd  Pt demonstrates improved dynamic stability with resisted walking 4-way and able to increase to 15# with increased difficulty noted with CGA for safety as he continues to exhibit limited stability when on right leg as stance leg and moving left but improving  Pt with significant improvement in quality of movement/stability during tandem walking and lateral step overs, however continues to struggle with fully clearing his right leg over hurdles due to limited knee flexion on right due to prosthetic device  Pt denies onset of pain during or post tx, only fatigue  Will continue to progress as symptoms allow  Plan: Continue per plan of care  Progress treatment as tolerated         Diagnosis: Generalized weakness   Precautions: Dementia, HTN, Hx of prostate cancer, hx of right leg discrepancy and wears prothesis on right   Primary Goals: Improve strength/endurance    POC Expires: 3/8/22   Re-evaluation Date: 3/8/22    FOTO Scores/Date: Goal - 73; 22 - 74; 2/10/22 - 80   Visit Count 6/10 7/10 1/10 2/10 3/10   Manuals 1/27 2/1 2/10 2/14 2/17                                           Ther Ex 1/27 2/1 2/10 2/14 2/17   SLR 3x10 ea 1# 3x10 ea 2#  3x10 ea 2# 3x10 ea 3#   SL hip abd 3x10 ea 1# 3x10 ea 2#  3x10 ea 2# 3x10 ea 3#   Bridges 2x10 20# 2x10 20#  [id number]" 20# 2x10 30#   Tandem walk Fwd/back 3 laps - CGA Fwd/back 3 laps - CGA   Fwd/back 3 laps - CGA/HHA   Suad step over  Lateral 2 laps - CGA   Lateral 2 laps - CGA/HHA              Re-assessed LE and functional tests;  Re-assessed FOTO - 80; Updated and reviewed PT prognosis/POC and goals              FOTO 74       Neuro Re-Ed                                                                                                                       Ther Act  1/27 2/1 2/17   Sit to stand To low mat + airex pad 2x10 - CGA To low mat + airex pad 2x10 - CGA  To low mat + airex pad 2x10 - CGA To low mat + airex pad 2x10 10# - CGA   Step ups 8" on left; 4" on right 2x10 ea 8" on left; 2" on right 2x10 ea  8" on left ; 4" on R 2x10 8" on left; 4" on right 2x10 ea   lunges        Lateral band walk grn 4 laps grn 4 laps  Blue 4 laps Blue 4 laps   SLS Left 3x15"; right 5x5" finger tip assist - CGA for safety Left 3x15"; right 5x5" finger tip assist - CGA for safety  Left 3x15"; right 5x5" finger tip assist - CGA for safety Left 3x15"; right 5x5" finger tip assist - CGA for safety   Resisted walking 4-way 3 laps ea; 10# 3 laps ea; 10#  3 laps ea 10# - CGA 3 laps ea; 15#                                    Functional testing            Pt education            Modalities

## 2022-02-21 ENCOUNTER — APPOINTMENT (OUTPATIENT)
Dept: PHYSICAL THERAPY | Facility: CLINIC | Age: 82
End: 2022-02-21
Payer: MEDICARE

## 2022-02-23 ENCOUNTER — OFFICE VISIT (OUTPATIENT)
Dept: PHYSICAL THERAPY | Facility: CLINIC | Age: 82
End: 2022-02-23
Payer: MEDICARE

## 2022-02-23 DIAGNOSIS — R53.1 GENERALIZED WEAKNESS: Primary | ICD-10-CM

## 2022-02-23 PROCEDURE — 97530 THERAPEUTIC ACTIVITIES: CPT | Performed by: PHYSICAL THERAPIST

## 2022-02-23 PROCEDURE — 97110 THERAPEUTIC EXERCISES: CPT | Performed by: PHYSICAL THERAPIST

## 2022-02-23 NOTE — PROGRESS NOTES
Daily Note     Today's date: 2022  Patient name: José Moyer  : 1940  MRN: [id number]  Referring provider: MARIA A Mckee  Dx:   Encounter Diagnosis     ICD-10-CM    1  Generalized weakness  R53 1        Start Time: 1400  Stop Time: 1500  Total time in clinic (min): 60 minutes    Subjective: Pt denies any pain after last visit, only soreness  Overall feels improving strength/stability with therapy  Objective: See treatment diary below      Assessment: Pt continues to be reliant on sequencing for step ups and resisted walking with notable difficulty with stability during fwd/backward stepping with left while stabilizing on right but improved with cues for small steps to improve stability but more difficulty initiating movements during first lap but improved with repetition with continued cues for slow control during eccentric return  Pt remains reliant on CGA and HHA during tandem walking and lateral ciara step overs but improved quality of movement and stability noted compared to previous sessions  Pt with increased fatigue throughout session with more reliance on rest breaks between exercises today  Will continue to progress as able next visit  Plan: Continue per plan of care  Progress treatment as tolerated         Diagnosis: Generalized weakness   Precautions: Dementia, HTN, Hx of prostate cancer, hx of right leg discrepancy and wears prothesis on right   Primary Goals: Improve strength/endurance    POC Expires: 3/8/22   Re-evaluation Date: 3/8/22    FOTO Scores/Date: Goal - 73; 22 - 74; 2/10/22 - 80   Visit Count 4/10 7/10 1/10 2/10 3/10   Manuals 2/23 2/1 2/10 2/14 2/17                                           Ther Ex 2/23 2/1 2/10 2/14 2/17   SLR 3x10 ea 3# 3x10 ea 2#  3x10 ea 2# 3x10 ea 3#   SL hip abd 3x10 ea 3# 3x10 ea 2#  3x10 ea 2# 3x10 ea 3#   Bridges 2x10 30# 2x10 20#  [id number]" 20# 2x10 30#   Tandem walk Fwd/back 3 laps - CGA Fwd/back 3 laps - CGA   Fwd/back 3 laps - CGA/HHA   Suad step over Lateral 2 laps - CGA Lateral 2 laps - CGA   Lateral 2 laps - CGA/HHA              Re-assessed LE and functional tests;  Re-assessed FOTO - 80; Updated and reviewed PT prognosis/POC and goals                     Neuro Re-Ed                                                                                                                       Ther Act  2/23 2/1 2/17   Sit to stand To low mat + airex pad 2x10 10# - CGA To low mat + airex pad 2x10 - CGA  To low mat + airex pad 2x10 - CGA To low mat + airex pad 2x10 10# - CGA   Step ups 8" on left; 4" on right 2x10 ea 8" on left; 2" on right 2x10 ea  8" on left ; 4" on R 2x10 8" on left; 4" on right 2x10 ea   lunges        Lateral band walk Blue 4 laps grn 4 laps  Blue 4 laps Blue 4 laps   SLS Left 3x15"; right 5x5" finger tip assist - CGA for safety Left 3x15"; right 5x5" finger tip assist - CGA for safety  Left 3x15"; right 5x5" finger tip assist - CGA for safety Left 3x15"; right 5x5" finger tip assist - CGA for safety   Resisted walking 4-way 3 laps ea; 15# 3 laps ea; 10#  3 laps ea 10# - CGA 3 laps ea; 15#                                    Functional testing            Pt education            Modalities

## 2022-02-24 ENCOUNTER — OFFICE VISIT (OUTPATIENT)
Dept: PHYSICAL THERAPY | Facility: CLINIC | Age: 82
End: 2022-02-24
Payer: MEDICARE

## 2022-02-24 DIAGNOSIS — R53.1 GENERALIZED WEAKNESS: Primary | ICD-10-CM

## 2022-02-24 PROCEDURE — 97530 THERAPEUTIC ACTIVITIES: CPT | Performed by: PHYSICAL THERAPIST

## 2022-02-24 PROCEDURE — 97110 THERAPEUTIC EXERCISES: CPT | Performed by: PHYSICAL THERAPIST

## 2022-02-24 NOTE — PROGRESS NOTES
Daily Note     Today's date: 2022  Patient name: Benoit December  : 1940  MRN: [id number]  Referring provider: MARIA A Vasquez  Dx:   Encounter Diagnosis     ICD-10-CM    1  Generalized weakness  R53 1        Start Time:   Stop Time: 1800  Total time in clinic (min): 43 minutes    Subjective: Pt reports fatigue after visit yesterday but no pain  Objective: See treatment diary below      Assessment: Pt remains easily fatigued with closed chain stability exercises and frequent CGA and HHA with exercises for safety  Pt with increased difficulty with resisted walking all planes with cues for sequencing of movement and to focus on slow eccentric return  Pt continues to rely on cues for sequencing of movement during step ups and lateral band walk and to avoid sliding trail leg on floor during eccentric phase of band walk  Pt with intermittent rest breaks required due to fatigue  No pain reported during or after treatment  Will continue to progress as symptoms allow  Plan: Continue per plan of care  Progress treatment as tolerated  Diagnosis: Generalized weakness   Precautions: Dementia, HTN, Hx of prostate cancer, hx of right leg discrepancy and wears prothesis on right   Primary Goals: Improve strength/endurance    POC Expires: 3/8/22   Re-evaluation Date: 3/8/22    FOTO Scores/Date: Goal - 73; 22 - ; 2/10/22 - 80   Visit Count 4/10 510 1/10 2/10 310   Manuals 2/23 2/24 2/10 2/14 2/17                                           Ther Ex 2/23 2/24 2/10 2/14 2/17   SLR 3x10 ea 3# 3x10 ea 3#  3x10 ea 2# 3x10 ea 3#   SL hip abd 3x10 ea 3# 3x10 ea 3#  3x10 ea 2# 3x10 ea 3#   Bridges 2x10 30# 2x10 30#  [id number]" 20# 2x10 30#   Tandem walk Fwd/back 3 laps - CGA Fwd/back 3 laps - CGA   Fwd/back 3 laps - CGA/HHA   Suad step over Lateral 2 laps - CGA Lateral 2 laps - CGA   Lateral 2 laps - CGA/HHA              Re-assessed LE and functional tests;  Re-assessed FOTO - 80; Updated and reviewed PT prognosis/POC and goals                     Neuro Re-Ed                                                                                                                       Ther Act  2/23 2/24 2/17   Sit to stand To low mat + airex pad 2x10 10# - CGA To low mat + airex pad 2x10 10# - CGA  To low mat + airex pad 2x10 - CGA To low mat + airex pad 2x10 10# - CGA   Step ups 8" on left; 4" on right 2x10 ea 8" on left; 4" on right 2x10 ea  8" on left ; 4" on R 2x10 8" on left; 4" on right 2x10 ea   lunges        Lateral band walk Blue 4 laps Blue 4 laps  Blue 4 laps Blue 4 laps   SLS Left 3x15"; right 5x5" finger tip assist - CGA for safety Left 3x15"; right 5x5" finger tip assist - CGA for safety  Left 3x15"; right 5x5" finger tip assist - CGA for safety Left 3x15"; right 5x5" finger tip assist - CGA for safety   Resisted walking 4-way 3 laps ea; 15# 3 laps ea; 15#  3 laps ea 10# - CGA 3 laps ea; 15#                                    Functional testing            Pt education            Modalities

## 2022-03-01 ENCOUNTER — OFFICE VISIT (OUTPATIENT)
Dept: PHYSICAL THERAPY | Facility: CLINIC | Age: 82
End: 2022-03-01
Payer: MEDICARE

## 2022-03-01 DIAGNOSIS — R53.1 GENERALIZED WEAKNESS: Primary | ICD-10-CM

## 2022-03-01 PROCEDURE — 97530 THERAPEUTIC ACTIVITIES: CPT

## 2022-03-01 PROCEDURE — 97110 THERAPEUTIC EXERCISES: CPT

## 2022-03-01 NOTE — PROGRESS NOTES
Daily Note     Today's date: 3/1/2022  Patient name: Valentina Milner  : 1940  MRN: [id number]  Referring provider: MARI AA Napier  Dx:   Encounter Diagnosis     ICD-10-CM    1  Generalized weakness  R53 1        Start Time:   Stop Time:   Total time in clinic (min): 48 minutes    Subjective: patient reports no new complaints  Reports continued slow progress  Denies any incidents since last therapy session      Objective: See treatment diary below      Assessment: patient was able to complete therapy without incident  Was able to progress in step ups on R performing to 6" step  CGA throughout for safety and hand held assist with tandem and ciara walking  Required small regression in load with lateral resisted walking due to fatigue  Normal rest intervals due to fatigue  Plan: Continue per plan of care  Progress treatment as tolerated         Diagnosis: Generalized weakness   Precautions: Dementia, HTN, Hx of prostate cancer, hx of right leg discrepancy and wears prothesis on right   Primary Goals: Improve strength/endurance    POC Expires: 3/8/22   Re-evaluation Date: 3/8/22    FOTO Scores/Date: Goal - 73; 22 - 74; 2/10/22 - 80   Visit Count 10 5/10 6/10 2/10 3/10   Manuals 2/23 2/24 2/10 2/14 2/17                                           Ther Ex 2/23 2/24 6/10 2/14 2/17   SLR 3x10 ea 3# 3x10 ea 3# 3# 3x10 3x10 ea 2# 3x10 ea 3#   SL hip abd 3x10 ea 3# 3x10 ea 3# 3# 3x10 3x10 ea 2# 3x10 ea 3#   Bridges 2x10 30# 2x10 30# 2x10 30# [id number]" 20# 2x10 30#   Tandem walk Fwd/back 3 laps - CGA Fwd/back 3 laps - CGA Fwd/back 3 laps CGA  Fwd/back 3 laps - CGA/HHA   Ciara step over Lateral 2 laps - CGA Lateral 2 laps - CGA Lateral x 2 laps  Lateral 2 laps - CGA/HHA                                   Neuro Re-Ed                                                                                                                       Ther Act     Sit to stand To low mat + airex pad 2x10 10# - CGA To low mat + airex pad 2x10 10# - CGA Low mat +airex 10# 2x10 To low mat + airex pad 2x10 - CGA To low mat + airex pad 2x10 10# - CGA   Step ups 8" on left; 4" on right 2x10 ea 8" on left; 4" on right 2x10 ea (L) 8" (R) 6" 2x10 ea 8" on left ; 4" on R 2x10 8" on left; 4" on right 2x10 ea   lunges        Lateral band walk Blue 4 laps Blue 4 laps Blue x 3 laps Blue 4 laps Blue 4 laps   SLS Left 3x15"; right 5x5" finger tip assist - CGA for safety Left 3x15"; right 5x5" finger tip assist - CGA for safety L 15"x3/ R 10"x5 finger tip rip-CGA for safety Left 3x15"; right 5x5" finger tip assist - CGA for safety Left 3x15"; right 5  5" finger tip assist - CGA for safety   Resisted walking 4-way 3 laps ea; 15# 3 laps ea; 15# 10-15# x 3 laps ea  3 laps ea 10# - CGA 3 laps ea; 15#                                    Functional testing            Pt education            Modalities

## 2022-03-03 ENCOUNTER — OFFICE VISIT (OUTPATIENT)
Dept: PHYSICAL THERAPY | Facility: CLINIC | Age: 82
End: 2022-03-03
Payer: MEDICARE

## 2022-03-03 DIAGNOSIS — R53.1 GENERALIZED WEAKNESS: Primary | ICD-10-CM

## 2022-03-03 PROCEDURE — 97110 THERAPEUTIC EXERCISES: CPT

## 2022-03-03 PROCEDURE — 97530 THERAPEUTIC ACTIVITIES: CPT

## 2022-03-03 NOTE — PROGRESS NOTES
Daily Note     Today's date: 3/3/2022  Patient name: Benoit December  : 1940  MRN: [id number]  Referring provider: MARIA A Vasquez  Dx:   Encounter Diagnosis     ICD-10-CM    1  Generalized weakness  R53 1        Start Time: 1630  Stop Time: 1720  Total time in clinic (min): 50 minutes    Subjective: patient reports L bicep/shoulder discomfort coming into therapy denying any incidents or complaints w/BL LE       Objective: See treatment diary below      Assessment:  Patient was able to complete therapy without reported increase to baseline shoulder pain or incident  Was able to progress in load with SLR based exercises reporting minor increase to difficulty  Demonstrates improved R LE hip flexion and less associated hip circumduction with fwd step up when compared to previous therapy session  CG during CKC for safety required  Plan: Continue per plan of care  Progress treatment as tolerated         Diagnosis: Generalized weakness   Precautions: Dementia, HTN, Hx of prostate cancer, hx of right leg discrepancy and wears prothesis on right   Primary Goals: Improve strength/endurance    POC Expires: 3/8/22   Re-evaluation Date: 3/8/22    FOTO Scores/Date: Goal - 73; 22 - 74; 2/10/22 - 80   Visit Count 4/10 5/10 6/10 7/10    Manuals 2/23 2/24 2/10 3/3            Ther Ex  610 3/3    SLR 3x10 ea 3# 3x10 ea 3# 3# 3x10 4# 3x10    SL hip abd 3x10 ea 3# 3x10 ea 3# 3# 3x10 4# 3x10    Bridges 2x10 30# 2x10 30# 2x10 30# 3x10 30#    Tandem walk Fwd/back 3 laps - CGA Fwd/back 3 laps - CGA Fwd/back 3 laps CGA     Saud step over Lateral 2 laps - CGA Lateral 2 laps - CGA Lateral x 2 laps                     Neuro Re-Ed    3/3                                     Ther Act     3/3    Sit to stand To low mat + airex pad 2x10 10# - CGA To low mat + airex pad 2x10 10# - CGA Low mat +airex 10# 2x10 Low mat+airex 10# 3x10    Step ups 8" on left; 4" on right 2x10 ea 8" on left; 4" on right 2x10 ea (L) 8" (R) 6" 2x10 ea (L)8" 5# kettle (R)6" 2x10 ea    lunges        Lateral band walk Blue 4 laps Blue 4 laps Blue x 3 laps BTB x 3 laps    SLS Left 3x15"; right 5x5" finger tip assist - CGA for safety Left 3x15"; right 5x5" finger tip assist - CGA for safety L 15"x3/ R 10"x5 finger tip rip-CGA for safety     Resisted walking 4-way 3 laps ea; 15# 3 laps ea; 15# 10-15# x 3 laps ea  13# x 3 laps ea                                     Functional testing          Pt education          Modalities

## 2022-03-08 ENCOUNTER — OFFICE VISIT (OUTPATIENT)
Dept: PHYSICAL THERAPY | Facility: CLINIC | Age: 82
End: 2022-03-08
Payer: MEDICARE

## 2022-03-08 DIAGNOSIS — R53.1 GENERALIZED WEAKNESS: Primary | ICD-10-CM

## 2022-03-08 PROCEDURE — 97112 NEUROMUSCULAR REEDUCATION: CPT

## 2022-03-08 PROCEDURE — 97110 THERAPEUTIC EXERCISES: CPT

## 2022-03-08 NOTE — PROGRESS NOTES
Daily Note     Today's date: 3/8/2022  Patient name: Neda Ventura  : 1940  MRN: [id number]  Referring provider: MARIA A Flowers  Dx:   Encounter Diagnosis     ICD-10-CM    1  Generalized weakness  R53 1        Start Time:   Stop Time:   Total time in clinic (min): 30 minutes    Subjective: patient reports no new complaints or incidents  Reports continued R shoulder pain with use  Denies any increased activity  Reports continued LE improved strength      Objective: See treatment diary below      Assessment: patient arrived to therapy  15 mins but was able to accommodate  Held load with sit to stand and resisted walking due to R shoulder pain  Cueing with sit to stand addressing eccentric control  Requires CG for safety with SLS along with hand held assist during tandem walking  Plan: Continue per plan of care  Progress treatment as tolerated         Diagnosis: Generalized weakness   Precautions: Dementia, HTN, Hx of prostate cancer, hx of right leg discrepancy and wears prothesis on right   Primary Goals: Improve strength/endurance    POC Expires: 3/8/22   Re-evaluation Date: 3/8/22    FOTO Scores/Date: Goal - 73; 22 - 74; 2/10/22 - 80   Visit Count 4/10 5/10 6/10 7/10 8/10   Manuals 2/23 2/24 2/10 3/3 3/8           Ther Ex 10 3/3 3/8   SLR 3x10 ea 3# 3x10 ea 3# 3# 3x10 4# 3x10 4# 3x10   SL hip abd 3x10 ea 3# 3x10 ea 3# 3# 3x10 4# 3x10 4# 3x10   Bridges 2x10 30# 2x10 30# 2x10 30# 3x10 30# 3x10 30#   Tandem walk Fwd/back 3 laps - CGA Fwd/back 3 laps - CGA Fwd/back 3 laps CGA  Fwd/back 3 laps CGA   Suad step over Lateral 2 laps - CGA Lateral 2 laps - CGA Lateral x 2 laps                     Neuro Re-Ed    3/3                                     Ther Act  2/23  2/24   3/3 3/8   Sit to stand To low mat + airex pad 2x10 10# - CGA To low mat + airex pad 2x10 10# - CGA Low mat +airex 10# 2x10 Low mat+airex 10# 3x10 No weight 3x10   Step ups 8" on left; 4" on right 2x10 ea 8" on left; 4" on right 2x10 ea (L) 8" (R) 6" 2x10 ea (L)8" 5# kettle (R)6" 2x10 ea (L)8" 5# (R)6" 2x10 ea   lunges        Lateral band walk Blue 4 laps Blue 4 laps Blue x 3 laps BTB x 3 laps BTB x 4 laps   SLS Left 3x15"; right 5x5" finger tip assist - CGA for safety Left 3x15"; right 5x5" finger tip assist - CGA for safety L 15"x3/ R 10"x5 finger tip rip-CGA for safety  15"x3 ea finger tip ass-CGA   Resisted walking 4-way 3 laps ea; 15# 3 laps ea; 15# 10-15# x 3 laps ea  13# x 3 laps ea                                     Functional testing          Pt education          Modalities

## 2022-03-10 ENCOUNTER — OFFICE VISIT (OUTPATIENT)
Dept: PHYSICAL THERAPY | Facility: CLINIC | Age: 82
End: 2022-03-10
Payer: MEDICARE

## 2022-03-10 DIAGNOSIS — R53.1 GENERALIZED WEAKNESS: Primary | ICD-10-CM

## 2022-03-10 PROCEDURE — 97110 THERAPEUTIC EXERCISES: CPT | Performed by: PHYSICAL THERAPIST

## 2022-03-10 NOTE — PROGRESS NOTES
PT Discharge    Today's date: 3/10/2022  Patient name: Melania Merlos  : 1940  MRN: [id number]  Referring provider: MARIA A Markham  Dx:   Encounter Diagnosis     ICD-10-CM    1  Generalized weakness  R53 1        Start Time:   Stop Time: 1700  Total time in clinic (min): 25 minutes    Assessment  Assessment details: Pt has been seen for 15 visits and has made excellent subjective/objective improvements since enrolling in therapy with improved FOTO score from 73 to 84 since initial evaluation  Pt has returned all normal ADLs and recreational activities without pain or limitation and is appropriate for discharge to home exercise program at this time  Pt encouraged to continue with HEP on regular basis per tolerance and was educated on how to progress/regress exercises per symptoms/tolerance  Pt is in agreement with plan  Goals  ST weeks  Pt will demonstrate good understanding and compliance with HEP  MET  Pt will ambulate with least restrictive AD with adequate step-through gait mechanics including proper toe clearance during mid-swing phase of gait to reduce fall risk MET    LT weeks  Pt will improve FOTO score to > or = to 83 to indicate improved functional abilities MET  Pt will increase bilateral hip/knee strength to 5/5 to allow for improved ability to squat, negotiate stairs, and for community ambulation MET  Pt will increase 5xSTS time to 18" without reliance on UE assistance and good eccentric control MET  Pt will improve TUG time to 10" to indicate improved functional strength, gait speed, and safety awareness NEARLY MET          Plan  Plan details: Discharge to HEP  Treatment plan discussed with: patient        Subjective Evaluation    History of Present Illness  Mechanism of injury: Pt states he is doing significantly better since enrolling in PT and overall notes better strength, balance, and walking tolerance   Pt is to be opening up his pool in next few weeks and plans to supplement his home exercises with swimming exercise   Pt feels he is ready to be discharged to home program    Pain  No pain reported          Objective     Functional Assessment        Comments  Pt ambulates unassisted with step to gait; prosthesis donned on right secondary to significant leg length discrepancy      Bilateral hip strength globally 4/5      Left Knee MMT: Flex 5/5 Ext 5/5  Right Knee MMT: Flex 5-/5 Ext 5-/5    UE strength globally 5-/5     5xSTS: 12 4" with 1 airex pad and 1 UE for assist, good ecc control with heavy LLE emphasis due to right leg length discrepancy    TU 5" from chair w/ 1 airex pad and 1 UE assist, walking unassisted    FOTO: 84 (improved from 80 at last re-assessment)              Diagnosis: Generalized weakness   Precautions: Dementia, HTN, Hx of prostate cancer, hx of right leg discrepancy and wears prothesis on right   Primary Goals: Improve strength/endurance    POC Expires: 3/8/22   Re-evaluation Date: 3/8/22    FOTO Scores/Date: Goal - 73; 22 - 74; 2/10/22 - 80   Visit Count 1/10 510 6/10 710 810   Manuals 3/10 2/24 2/10 3/3 3/8           Ther Ex 3/10 2/24 6/10 3/3 3/8   SLR  3x10 ea 3# 3# 3x10 4# 3x10 4# 3x10   SL hip abd  3x10 ea 3# 3# 3x10 4# 3x10 4# 3x10   Bridges  2x10 30# 2x10 30# 3x10 30# 3x10 30#   Tandem walk  Fwd/back 3 laps - CGA Fwd/back 3 laps CGA  Fwd/back 3 laps CGA   Suad step over  Lateral 2 laps - CGA Lateral x 2 laps      Re-assessed UE/LE; re-assessed FOTO; HEP creation/instruction, educated on importance of continued HEP compliance               Neuro Re-Ed    3/3                                     Ther Act    2/24   3/3 3/8   Sit to stand  To low mat + airex pad 2x10 10# - CGA Low mat +airex 10# 2x10 Low mat+airex 10# 3x10 No weight 3x10   Step ups  8" on left; 4" on right 2x10 ea (L) 8" (R) 6" 2x10 ea (L)8" 5# kettle (R)6" 2x10 ea (L)8" 5# (R)6" 2x10 ea   lunges        Lateral band walk  Blue 4 laps Blue x 3 laps BTB x 3 laps BTB x 4 laps   SLS  Left 3x15"; right 5x5" finger tip assist - CGA for safety L 15"x3/ R 10"x5 finger tip rip-CGA for safety  15"x3 ea finger tip ass-CGA   Resisted walking 4-way  3 laps ea; 15# 10-15# x 3 laps ea  13# x 3 laps ea                                     Functional testing          Pt education          Modalities

## 2022-04-06 ENCOUNTER — OFFICE VISIT (OUTPATIENT)
Dept: FAMILY MEDICINE CLINIC | Facility: CLINIC | Age: 82
End: 2022-04-06
Payer: MEDICARE

## 2022-04-06 VITALS
WEIGHT: 141 LBS | SYSTOLIC BLOOD PRESSURE: 112 MMHG | BODY MASS INDEX: 23.49 KG/M2 | OXYGEN SATURATION: 97 % | HEIGHT: 65 IN | HEART RATE: 98 BPM | DIASTOLIC BLOOD PRESSURE: 64 MMHG

## 2022-04-06 DIAGNOSIS — K59.00 CONSTIPATION, UNSPECIFIED CONSTIPATION TYPE: ICD-10-CM

## 2022-04-06 DIAGNOSIS — F03.90 MILD DEMENTIA (HCC): ICD-10-CM

## 2022-04-06 DIAGNOSIS — E78.49 OTHER HYPERLIPIDEMIA: ICD-10-CM

## 2022-04-06 DIAGNOSIS — I10 ESSENTIAL (PRIMARY) HYPERTENSION: Primary | ICD-10-CM

## 2022-04-06 PROCEDURE — 99214 OFFICE O/P EST MOD 30 MIN: CPT | Performed by: NURSE PRACTITIONER

## 2022-04-06 RX ORDER — CALCIUM POLYCARBOPHIL 625 MG 625 MG/1
625 TABLET ORAL DAILY
Qty: 90 TABLET | Refills: 3 | Status: SHIPPED | OUTPATIENT
Start: 2022-04-06

## 2022-04-06 NOTE — PROGRESS NOTES
Assessment/Plan:     Chronic idiopathic constipation  Extensive discussion with patient regarding increasing exercise activity, fiber intake, fluid intake  Fiber pill ordered  Patient reports that he can not take the Metamucil or any powdered fiber  Continue with Dulcolax as needed    Essential (primary) hypertension  Blood pressure is at goal   Patient does not know what it measures at home but states that he feels well  Continue heart healthy low-salt diet  Continue medication  Blood work ordered    Mild dementia [facility])  Discussed with patient memory exercises  Not taking taking the Namenda  Referral made to Neurology         Problem List Items Addressed This Visit        Cardiovascular and Mediastinum    Essential (primary) hypertension - Primary     Blood pressure is at goal   Patient does not know what it measures at home but states that he feels well  Continue heart healthy low-salt diet  Continue medication  Blood work ordered         Relevant Orders    Comprehensive metabolic panel    CBC and differential    Lipid panel       Nervous and Auditory    Mild dementia ([address] )     Discussed with patient memory exercises  Not taking taking the Namenda  Referral made to Neurology         Relevant Orders    Ambulatory Referral to Neurology       Other    Other hyperlipidemia    Relevant Orders    Lipid panel      Other Visit Diagnoses     Constipation, unspecified constipation type        Relevant Medications    calcium polycarbophil (FIBERCON) 625 mg tablet    bisacodyl (DULCOLAX) 5 mg EC tablet            Subjective:      Patient ID: Winnie Castellanos is a 79 y o  male  Patient is here for follow-up  Reports he completed physical therapy, felt good doing that  Continues to have problem with constipation  Needs to have a neurology follow-up because of dementia        The following portions of the patient's history were reviewed and updated as appropriate: allergies, current medications, past family history, past medical history, past social history, past surgical history and problem list     Review of Systems      Objective:      /64   Pulse 98   Ht 5' 4 5" (1 638 m)   Wt 64 kg (141 lb)   SpO2 97%   BMI 23 83 kg/m²          Physical Exam  Vitals and nursing note reviewed  Constitutional:       Appearance: Normal appearance  He is well-developed  HENT:      Head: Normocephalic and atraumatic  Right Ear: Tympanic membrane normal       Left Ear: Tympanic membrane normal       Nose: Nose normal       Mouth/Throat:      Mouth: Mucous membranes are dry  Eyes:      Extraocular Movements: Extraocular movements intact  Pupils: Pupils are equal, round, and reactive to light  Cardiovascular:      Rate and Rhythm: Normal rate and regular rhythm  Pulses: Normal pulses  Heart sounds: Normal heart sounds  Pulmonary:      Effort: Pulmonary effort is normal       Breath sounds: Normal breath sounds  Abdominal:      General: There is no distension  Palpations: Abdomen is soft  Tenderness: There is no abdominal tenderness  Musculoskeletal:         General: Normal range of motion  Cervical back: Normal range of motion  Skin:     General: Skin is warm and dry  Neurological:      Mental Status: He is alert and oriented to person, place, and time  Mental status is at baseline  Psychiatric:         Mood and Affect: Mood normal          Behavior: Behavior normal          Thought Content:  Thought content normal          Judgment: Judgment normal

## 2022-04-06 NOTE — PATIENT INSTRUCTIONS
Obtain fasting labs, nothing to eat after midnight, may drink water  Continue medication  Constipation   WHAT YOU NEED TO KNOW:   Constipation means you have hard, dry bowel movements, or you go longer than usual between bowel movements  DISCHARGE INSTRUCTIONS:   Call your doctor if:   · You have blood in your bowel movements  · You have a fever and abdominal pain with the constipation  · Your constipation gets worse  · You start to vomit  · You have questions or concerns about your condition or care  Medicines:   · Medicine  such as a laxative may help relax and loosen your intestines to help you have a bowel movement  Your provider may recommend you only use laxatives for a short time  Long-term use may make your bowels dependent on the medicine  · Take your medicine as directed  Contact your healthcare provider if you think your medicine is not helping or if you have side effects  Tell him of her if you are allergic to any medicine  Keep a list of the medicines, vitamins, and herbs you take  Include the amounts, and when and why you take them  Bring the list or the pill bottles to follow-up visits  Carry your medicine list with you in case of an emergency  Relieve constipation:   · A suppository  may be used to help soften your bowel movements  This may make them easier to pass  A suppository is guided into your rectum through your anus  · An enema  is liquid medicine used to clear bowel movement from your rectum  The medicine is put into your rectum through your anus  Prevent constipation:   · Drink liquids as directed  You may need to drink extra liquids to help soften and move your bowels  Ask how much liquid to drink each day and which liquids are best for you  · Eat high-fiber foods  This may help decrease constipation by adding bulk to your bowel movements  High-fiber foods include fruit, vegetables, whole-grain breads and cereals, and beans   Your healthcare provider or dietitian can help you create a high-fiber meal plan  Your provider may also recommend a fiber supplement if you cannot get enough fiber from food  · Exercise regularly  Regular physical activity can help stimulate your intestines  Walking is a good exercise to prevent or relieve constipation  Ask which exercises are best for you  · Schedule a time each day to have a bowel movement  This may help train your body to have regular bowel movements  Bend forward while you are on the toilet to help move the bowel movement out  Sit on the toilet for at least 10 minutes, even if you do not have a bowel movement  · Talk to your healthcare provider about your medicines  Certain medicines, such as opioids, can cause constipation  Your provider may be able to make medicine changes  For example, he or she may change the kind of medicine, or change when you take it  Follow up with your doctor as directed:  Write down your questions so you remember to ask them during your visits  © Copyright [employer] 2022 Information is for End User's use only and may not be sold, redistributed or otherwise used for commercial purposes  All illustrations and images included in CareNotes® are the copyrighted property of [employer]  or [facility] Yaneli Loving   The above information is an  only  It is not intended as medical advice for individual conditions or treatments  Talk to your doctor, nurse or pharmacist before following any medical regimen to see if it is safe and effective for you

## 2022-04-07 NOTE — ASSESSMENT & PLAN NOTE
Discussed with patient memory exercises  Not taking taking the Namenda    Referral made to Neurology

## 2022-04-07 NOTE — ASSESSMENT & PLAN NOTE
Extensive discussion with patient regarding increasing exercise activity, fiber intake, fluid intake  Fiber pill ordered  Patient reports that he can not take the Metamucil or any powdered fiber    Continue with Dulcolax as needed

## 2022-04-07 NOTE — ASSESSMENT & PLAN NOTE
Blood pressure is at goal   Patient does not know what it measures at home but states that he feels well  Continue heart healthy low-salt diet  Continue medication    Blood work ordered

## 2022-05-16 DIAGNOSIS — E78.49 OTHER HYPERLIPIDEMIA: ICD-10-CM

## 2022-05-16 RX ORDER — ROSUVASTATIN CALCIUM 10 MG/1
TABLET, COATED ORAL
Qty: 90 TABLET | Refills: 1 | Status: SHIPPED | OUTPATIENT
Start: 2022-05-16

## 2022-06-22 ENCOUNTER — OFFICE VISIT (OUTPATIENT)
Dept: FAMILY MEDICINE CLINIC | Facility: CLINIC | Age: 82
End: 2022-06-22
Payer: MEDICARE

## 2022-06-22 VITALS
TEMPERATURE: 97.4 F | HEIGHT: 65 IN | RESPIRATION RATE: 18 BRPM | BODY MASS INDEX: 23.36 KG/M2 | WEIGHT: 140.2 LBS | SYSTOLIC BLOOD PRESSURE: 116 MMHG | OXYGEN SATURATION: 97 % | HEART RATE: 66 BPM | DIASTOLIC BLOOD PRESSURE: 68 MMHG

## 2022-06-22 DIAGNOSIS — F03.90 MILD DEMENTIA (HCC): ICD-10-CM

## 2022-06-22 DIAGNOSIS — I10 ESSENTIAL (PRIMARY) HYPERTENSION: Primary | ICD-10-CM

## 2022-06-22 PROCEDURE — 99214 OFFICE O/P EST MOD 30 MIN: CPT | Performed by: NURSE PRACTITIONER

## 2022-06-22 NOTE — PROGRESS NOTES
Assessment/Plan:     Essential (primary) hypertension  Pt does not check BP regularly  Spouse states if he does occasionally, BP is in the 130s-140s/80s  Recheck in the office today was normal  On Lisinopril 10 mg  Continue with current med  Mild dementia ([facility] [address] )  Spouse states pt sits in recliner all day and does not do any physical activity  No issues with mobility, just requires a push or encouragement to move  Discussed increasing activity and mental exercise  Does have a pool at home  Encouraged to get in pool daily  No reports of cognitive decline  Problem List Items Addressed This Visit        Cardiovascular and Mediastinum    Essential (primary) hypertension - Primary     Pt does not check BP regularly  Spouse states if he does occasionally, BP is in the 130s-140s/80s  Recheck in the office today was normal  On Lisinopril 10 mg  Continue with current med  Nervous and Auditory    Mild dementia ([facility] [address] )     Spouse states pt sits in recliner all day and does not do any physical activity  No issues with mobility, just requires a push or encouragement to move  Discussed increasing activity and mental exercise  Does have a pool at home  Encouraged to get in pool daily  No reports of cognitive decline  Subjective:      Patient ID: Oren Orona is a 79 y o  male  HPI    The following portions of the patient's history were reviewed and updated as appropriate: allergies, current medications, past family history, past medical history, past social history, past surgical history and problem list     Review of Systems   Constitutional: Positive for activity change and fatigue  Negative for unexpected weight change  HENT: Negative  Respiratory: Negative  Negative for chest tightness and shortness of breath  Cardiovascular: Negative  Negative for chest pain  Gastrointestinal: Negative  Genitourinary: Negative  Neurological: Negative      Psychiatric/Behavioral: Negative  Objective:      /68   Pulse 66   Temp (!) 97 4 °F (36 3 °C)   Resp 18   Ht 5' 4 5" (1 638 m)   Wt 63 6 kg (140 lb 3 2 oz)   SpO2 97%   BMI 23 69 kg/m²          Physical Exam  Constitutional:       Appearance: Normal appearance  He is normal weight  HENT:      Head: Normocephalic and atraumatic  Cardiovascular:      Rate and Rhythm: Normal rate and regular rhythm  Heart sounds: Normal heart sounds  Pulmonary:      Effort: Pulmonary effort is normal       Breath sounds: Normal breath sounds  Skin:     General: Skin is warm and dry  Neurological:      Mental Status: He is alert  Mental status is at baseline  Psychiatric:         Mood and Affect: Mood normal          Behavior: Behavior normal          Thought Content:  Thought content normal

## 2022-06-22 NOTE — PATIENT INSTRUCTIONS
Obtain fasting labs, nothing to eat after midnight, may drink water  Get in the pool  Increase physical activity    Continue mental exercises, xavier

## 2022-06-22 NOTE — ASSESSMENT & PLAN NOTE
Pt does not check BP regularly  Spouse states if he does occasionally, BP is in the 130s-140s/80s  Recheck in the office today was normal  On Lisinopril 10 mg  Continue with current med

## 2022-06-22 NOTE — ASSESSMENT & PLAN NOTE
Spouse states pt sits in recliner all day and does not do any physical activity  No issues with mobility, just requires a push or encouragement to move  Discussed increasing activity and mental exercise  Does have a pool at home  Encouraged to get in pool daily  No reports of cognitive decline

## 2022-08-12 ENCOUNTER — TELEPHONE (OUTPATIENT)
Dept: GERIATRICS | Age: 82
End: 2022-08-12

## 2022-08-12 NOTE — TELEPHONE ENCOUNTER
Called spouse, [facility], to confirm patient's upcoming appointment  Spouse relayed she is in the hospital right now and wishes to cancel the patient's appointment and relayed she wants patient to be seen by Neurology instead of Geriatrics  This MA requested spouse call back when she is discharged from hospital and is feeling better; can be discussed at that time; appointment for 8/16/22 cancelled

## 2022-08-19 ENCOUNTER — TELEPHONE (OUTPATIENT)
Dept: FAMILY MEDICINE CLINIC | Facility: CLINIC | Age: 82
End: 2022-08-19

## 2022-08-19 DIAGNOSIS — I10 ESSENTIAL (PRIMARY) HYPERTENSION: ICD-10-CM

## 2022-08-19 RX ORDER — LISINOPRIL 10 MG/1
10 TABLET ORAL DAILY
Qty: 90 TABLET | Refills: 3 | Status: SHIPPED | OUTPATIENT
Start: 2022-08-19

## 2022-10-12 PROBLEM — H61.23 BILATERAL IMPACTED CERUMEN: Status: RESOLVED | Noted: 2022-01-19 | Resolved: 2022-10-12

## 2022-10-12 PROBLEM — Z00.00 MEDICARE ANNUAL WELLNESS VISIT, SUBSEQUENT: Status: RESOLVED | Noted: 2022-01-06 | Resolved: 2022-10-12

## 2022-11-29 DIAGNOSIS — E78.49 OTHER HYPERLIPIDEMIA: ICD-10-CM

## 2022-11-29 RX ORDER — ROSUVASTATIN CALCIUM 10 MG/1
TABLET, COATED ORAL
Qty: 90 TABLET | Refills: 1 | Status: SHIPPED | OUTPATIENT
Start: 2022-11-29

## 2023-05-30 ENCOUNTER — OFFICE VISIT (OUTPATIENT)
Dept: FAMILY MEDICINE CLINIC | Facility: CLINIC | Age: 83
End: 2023-05-30

## 2023-05-30 VITALS
SYSTOLIC BLOOD PRESSURE: 126 MMHG | OXYGEN SATURATION: 96 % | BODY MASS INDEX: 22.49 KG/M2 | HEIGHT: 65 IN | HEART RATE: 63 BPM | TEMPERATURE: 97.5 F | WEIGHT: 135 LBS | RESPIRATION RATE: 16 BRPM | DIASTOLIC BLOOD PRESSURE: 70 MMHG

## 2023-05-30 DIAGNOSIS — G30.0 MILD EARLY ONSET ALZHEIMER'S DEMENTIA, UNSPECIFIED WHETHER BEHAVIORAL, PSYCHOTIC, OR MOOD DISTURBANCE OR ANXIETY (HCC): Primary | ICD-10-CM

## 2023-05-30 DIAGNOSIS — F02.A0 MILD EARLY ONSET ALZHEIMER'S DEMENTIA, UNSPECIFIED WHETHER BEHAVIORAL, PSYCHOTIC, OR MOOD DISTURBANCE OR ANXIETY (HCC): Primary | ICD-10-CM

## 2023-05-30 DIAGNOSIS — I10 ESSENTIAL (PRIMARY) HYPERTENSION: ICD-10-CM

## 2023-05-30 DIAGNOSIS — C61 PROSTATE CANCER (HCC): ICD-10-CM

## 2023-05-30 DIAGNOSIS — E78.49 OTHER HYPERLIPIDEMIA: ICD-10-CM

## 2023-05-30 RX ORDER — ROSUVASTATIN CALCIUM 10 MG/1
5 TABLET, COATED ORAL DAILY
Qty: 90 TABLET | Refills: 1 | Status: SHIPPED | OUTPATIENT
Start: 2023-05-30

## 2023-05-30 RX ORDER — LISINOPRIL 10 MG/1
10 TABLET ORAL DAILY
Qty: 90 TABLET | Refills: 3 | Status: SHIPPED | OUTPATIENT
Start: 2023-05-30

## 2023-05-30 RX ORDER — MEMANTINE HYDROCHLORIDE 10 MG/1
10 TABLET ORAL 2 TIMES DAILY
Qty: 90 TABLET | Refills: 0 | Status: SHIPPED | OUTPATIENT
Start: 2023-05-30

## 2023-05-30 NOTE — PATIENT INSTRUCTIONS
Blood work done  Make appointment with neurology  Continue diet continue exercise  We will call the daughter to talk about extended hours  Maintain safety is much as possible  Life alert bradena

## 2023-05-30 NOTE — PROGRESS NOTES
Assessment and Plan:     Problem List Items Addressed This Visit        Cardiovascular and Mediastinum    Essential (primary) hypertension     Blood pressure is well controlled  Continue medication  Blood work ordered  Relevant Medications    lisinopril (ZESTRIL) 10 mg tablet    Other Relevant Orders    CBC and differential    Comprehensive metabolic panel    Lipid panel    Albumin / creatinine urine ratio       Nervous and Auditory    Mild dementia ([facility] [address] ) - Primary     Patient has mild dementia  Referral was made to neurology, needs a new referral   States that he was not sure that he is on Namenda  Medication reordered  Discussed maintaining safety  Relevant Medications    memantine (NAMENDA) 10 mg tablet    Other Relevant Orders    Ambulatory Referral to Neurology       Genitourinary    Prostate cancer [facility])       Other    Other hyperlipidemia    Relevant Medications    rosuvastatin (CRESTOR) 10 MG tablet       Depression Screening and Follow-up Plan: Patient was screened for depression during today's encounter  They screened negative with a PHQ-2 score of 1  Falls Plan of Care: balance, strength, and gait training instructions were provided  Medications that increase falls were reviewed  Home safety education provided  Referral to neurology was provided  Preventive health issues were discussed with patient, and age appropriate screening tests were ordered as noted in patient's After Visit Summary  Personalized health advice and appropriate referrals for health education or preventive services given if needed, as noted in patient's After Visit Summary  History of Present Illness:     Patient presents for a Medicare Wellness Visit    Is here for Medicare wellness  Generally doing well at home  Does have visiting [place] 4 hours a day  Reports that he has been eating at least 3 meals  Has yellow for breakfast in the morning has lunch prepared by nurses aide    Swimming every morning  Patient Care Team:  Jesi Ewing as PCP - General (Nurse Practitioner)  MD Cayden Helm MD     Review of Systems:     Review of Systems   Constitutional: Negative  HENT: Negative  Eyes: Negative  Respiratory: Negative  Cardiovascular: Negative  Gastrointestinal: Negative  Endocrine: Negative  Genitourinary: Negative  Musculoskeletal: Negative  Skin: Negative  Allergic/Immunologic: Negative  Neurological: Negative  Hematological: Negative  Psychiatric/Behavioral: Negative           Problem List:     Patient Active Problem List   Diagnosis   • Mild dementia ([facility] )   • Elevated BP without diagnosis of hypertension   • Other hyperlipidemia   • Other constipation   • Hearing loss   • Prostate cancer ([facility] )   • Encounter to establish care   • Essential (primary) hypertension   • Chronic idiopathic constipation   • Driving safety issue   • Post covid-19 condition, unspecified   • Medicare annual wellness visit, subsequent   • Generalized weakness      Past Medical and Surgical History:     Past Medical History:   Diagnosis Date   • Memory loss    • Personal history of irradiation    • Prostate cancer ([facility] )      Past Surgical History:   Procedure Laterality Date   • PROSTATE BIOPSY        Family History:     Family History   Problem Relation Age of Onset   • Colon cancer Mother    • Nephrolithiasis Father    • Stroke Father    • Heart disease Father       Social History:     Social History     Socioeconomic History   • Marital status: /Civil Union     Spouse name: None   • Number of children: None   • Years of education: None   • Highest education level: None   Occupational History   • None   Tobacco Use   • Smoking status: Never   • Smokeless tobacco: Never   Vaping Use   • Vaping Use: Never used   Substance and Sexual Activity   • Alcohol use: Yes     Comment: 2/day   • Drug use: No   • Sexual activity: None   Other Topics Concern   • None Social History Narrative   • None     Social Determinants of Health     Financial Resource Strain: Low Risk  (5/30/2023)    Overall Financial Resource Strain (CARDIA)    • Difficulty of Paying Living Expenses: Not hard at all   Food Insecurity: Not on file   Transportation Needs: No Transportation Needs (5/30/2023)    PRAPARE - Transportation    • Lack of Transportation (Medical): No    • Lack of Transportation (Non-Medical): No   Physical Activity: Not on file   Stress: Not on file   Social Connections: Not on file   Intimate Partner Violence: Not on file   Housing Stability: Not on file      Medications and Allergies:     Current Outpatient Medications   Medication Sig Dispense Refill   • aspirin (ECOTRIN LOW STRENGTH) 81 mg EC tablet Take by mouth     • lisinopril (ZESTRIL) 10 mg tablet Take 1 tablet (10 mg total) by mouth daily 90 tablet 3   • memantine (NAMENDA) 10 mg tablet Take 1 tablet (10 mg total) by mouth 2 (two) times a day 90 tablet 0   • rosuvastatin (CRESTOR) 10 MG tablet Take 0 5 tablets (5 mg total) by mouth daily 90 tablet 1   • Aspirin Buf,CaCarb-MgCarb-MgO, 81 MG TABS Take by mouth     • bisacodyl (DULCOLAX) 5 mg EC tablet Take 1 tablet (5 mg total) by mouth daily as needed for constipation 30 tablet 3   • calcipotriene (DOVONEX) 0 005 % cream Apply topically     • calcium polycarbophil (FIBERCON) 625 mg tablet Take 1 tablet (625 mg total) by mouth daily 90 tablet 3   • CYANOCOBALAMIN PO Take by mouth     • meloxicam (MOBIC) 7 5 mg tablet meloxicam 7 5 mg tablet   TAKE 1 TABLET BY MOUTH EVERY DAY AS NEEDED       No current facility-administered medications for this visit       No Known Allergies   Immunizations:     Immunization History   Administered Date(s) Administered   • COVID-19 MODERNA VACC 0 25 ML IM BOOSTER 12/22/2021   • COVID-19 MODERNA VACC 0 5 ML IM 02/03/2021, 03/02/2021   • DTP 03/10/2009   • INFLUENZA 11/17/2005, 11/02/2010   • Influenza Split High Dose Preservative Free IM 01/02/2020   • Influenza, Seasonal Vaccine, Quadrivalent, Adjuvanted,   5e 10/23/2020   • Pneumococcal Polysaccharide PPV23 11/23/2004, 10/23/2020   • Tdap 06/01/2015   • Zoster 12/02/2008      Health Maintenance:         Topic Date Due   • Hepatitis C Screening  Completed         Topic Date Due   • COVID-19 Vaccine (4 - Moderna series) 02/16/2022      Medicare Screening Tests and Risk Assessments:     Jerral Prader is here for his Subsequent Wellness visit  Last Medicare Wellness visit information reviewed, patient interviewed and updates made to the record today  Health Risk Assessment:   Patient rates overall health as very good  Patient feels that their physical health rating is same  Patient is very satisfied with their life  Eyesight was rated as same  Hearing was rated as slightly worse  Patient feels that their emotional and mental health rating is same  Patients states they are never, rarely angry  Patient states they are never, rarely unusually tired/fatigued  Pain experienced in the last 7 days has been none  Patient states that he has experienced no weight loss or gain in last 6 months  Depression Screening:   PHQ-2 Score: 1      Fall Risk Screening: In the past year, patient has experienced: history of falling in past year    Number of falls: 2 or more  Injured during fall?: No    Feels unsteady when standing or walking?: Yes    Worried about falling?: No      Home Safety:  Patient does not have trouble with stairs inside or outside of their home  Patient has working smoke alarms and has working carbon monoxide detector  Home safety hazards include: none  Nutrition:   Current diet is Regular  Medications:   Patient is currently taking over-the-counter supplements  OTC medications include: see medication list  Patient is able to manage medications       Activities of Daily Living (ADLs)/Instrumental Activities of Daily Living (IADLs):   Walk and transfer into and out of bed and chair?: "Yes  Dress and groom yourself?: Yes    Bathe or shower yourself?: Yes    Feed yourself? Yes  Do your laundry/housekeeping?: No  Manage your money, pay your bills and track your expenses?: Yes  Make your own meals?: No    Do your own shopping?: No    Previous Hospitalizations:   Any hospitalizations or ED visits within the last 12 months?: No      Advance Care Planning:   Living will: Yes    Advanced directive: Yes    End of Life Decisions reviewed with patient: Yes      Cognitive Screening:   Provider or family/friend/caregiver concerned regarding cognition?: Yes    PREVENTIVE SCREENINGS      Cardiovascular Screening:    General: Screening Not Indicated and History Lipid Disorder      Prostate Cancer Screening:    General: History Prostate Cancer and Screening Not Indicated      Osteoporosis Screening:      Due for: Bone Density Ultrasound      Lung Cancer Screening:     General: Screening Not Indicated      Hepatitis C Screening:    General: Screening Current    Screening, Brief Intervention, and Referral to Treatment (SBIRT)    Screening  Typical number of drinks in a day: 0  Typical number of drinks in a week: 0  Interpretation: Low risk drinking behavior  Single Item Drug Screening:  How often have you used an illegal drug (including marijuana) or a prescription medication for non-medical reasons in the past year? never    Single Item Drug Screen Score: 0  Interpretation: Negative screen for possible drug use disorder    Other Counseling Topics:   Car/seat belt/driving safety, skin self-exam, sunscreen and calcium and vitamin D intake and regular weightbearing exercise  No results found  Physical Exam:     /70   Pulse 63   Temp 97 5 °F (36 4 °C) (Temporal)   Resp 16   Ht 5' 4 5\" (1 638 m)   Wt 61 2 kg (135 lb)   SpO2 96%   BMI 22 81 kg/m²     Physical Exam  Constitutional:       Appearance: Normal appearance  He is well-developed  HENT:      Head: Normocephalic and atraumatic   " Cardiovascular:      Rate and Rhythm: Normal rate and regular rhythm  Pulses: Normal pulses  Heart sounds: Normal heart sounds  Pulmonary:      Effort: Pulmonary effort is normal       Breath sounds: Normal breath sounds  Musculoskeletal:      Cervical back: Normal range of motion and neck supple  Skin:     General: Skin is warm and dry  Neurological:      General: No focal deficit present  Mental Status: He is alert and oriented to person, place, and time  Psychiatric:         Behavior: Behavior normal          Thought Content:  Thought content normal          Judgment: Judgment normal           MARIA A Cano

## 2023-05-31 ENCOUNTER — TELEPHONE (OUTPATIENT)
Dept: FAMILY MEDICINE CLINIC | Facility: CLINIC | Age: 83
End: 2023-05-31

## 2023-05-31 ENCOUNTER — APPOINTMENT (OUTPATIENT)
Dept: LAB | Facility: HOSPITAL | Age: 83
End: 2023-05-31
Payer: MEDICARE

## 2023-05-31 DIAGNOSIS — I10 ESSENTIAL (PRIMARY) HYPERTENSION: ICD-10-CM

## 2023-05-31 LAB
ALBUMIN SERPL BCP-MCNC: 4.6 G/DL (ref 3.5–5)
ALP SERPL-CCNC: 59 U/L (ref 34–104)
ALT SERPL W P-5'-P-CCNC: 14 U/L (ref 7–52)
ANION GAP SERPL CALCULATED.3IONS-SCNC: 5 MMOL/L (ref 4–13)
AST SERPL W P-5'-P-CCNC: 17 U/L (ref 13–39)
BASOPHILS # BLD AUTO: 0.05 THOUSANDS/ÂΜL (ref 0–0.1)
BASOPHILS NFR BLD AUTO: 1 % (ref 0–1)
BILIRUB SERPL-MCNC: 0.66 MG/DL (ref 0.2–1)
BUN SERPL-MCNC: 20 MG/DL (ref 5–25)
CALCIUM SERPL-MCNC: 9.3 MG/DL (ref 8.4–10.2)
CHLORIDE SERPL-SCNC: 105 MMOL/L (ref 96–108)
CHOLEST SERPL-MCNC: 165 MG/DL
CO2 SERPL-SCNC: 29 MMOL/L (ref 21–32)
CREAT SERPL-MCNC: 0.69 MG/DL (ref 0.6–1.3)
CREAT UR-MCNC: 15 MG/DL
EOSINOPHIL # BLD AUTO: 0.26 THOUSAND/ÂΜL (ref 0–0.61)
EOSINOPHIL NFR BLD AUTO: 4 % (ref 0–6)
ERYTHROCYTE [DISTWIDTH] IN BLOOD BY AUTOMATED COUNT: 13 % (ref 11.6–15.1)
GFR SERPL CREATININE-BSD FRML MDRD: 87 ML/MIN/1.73SQ M
GLUCOSE P FAST SERPL-MCNC: 94 MG/DL (ref 65–99)
HCT VFR BLD AUTO: 41.1 % (ref 36.5–49.3)
HDLC SERPL-MCNC: 60 MG/DL
HGB BLD-MCNC: 13.4 G/DL (ref 12–17)
IMM GRANULOCYTES # BLD AUTO: 0.02 THOUSAND/UL (ref 0–0.2)
IMM GRANULOCYTES NFR BLD AUTO: 0 % (ref 0–2)
LDLC SERPL CALC-MCNC: 95 MG/DL (ref 0–100)
LYMPHOCYTES # BLD AUTO: 2.12 THOUSANDS/ÂΜL (ref 0.6–4.47)
LYMPHOCYTES NFR BLD AUTO: 32 % (ref 14–44)
MCH RBC QN AUTO: 30.5 PG (ref 26.8–34.3)
MCHC RBC AUTO-ENTMCNC: 32.6 G/DL (ref 31.4–37.4)
MCV RBC AUTO: 93 FL (ref 82–98)
MICROALBUMIN UR-MCNC: 10.4 MG/L (ref 0–20)
MICROALBUMIN/CREAT 24H UR: 69 MG/G CREATININE (ref 0–30)
MONOCYTES # BLD AUTO: 0.78 THOUSAND/ÂΜL (ref 0.17–1.22)
MONOCYTES NFR BLD AUTO: 12 % (ref 4–12)
NEUTROPHILS # BLD AUTO: 3.46 THOUSANDS/ÂΜL (ref 1.85–7.62)
NEUTS SEG NFR BLD AUTO: 51 % (ref 43–75)
NONHDLC SERPL-MCNC: 105 MG/DL
NRBC BLD AUTO-RTO: 0 /100 WBCS
PLATELET # BLD AUTO: 237 THOUSANDS/UL (ref 149–390)
PMV BLD AUTO: 8.9 FL (ref 8.9–12.7)
POTASSIUM SERPL-SCNC: 3.5 MMOL/L (ref 3.5–5.3)
PROT SERPL-MCNC: 7.3 G/DL (ref 6.4–8.4)
RBC # BLD AUTO: 4.4 MILLION/UL (ref 3.88–5.62)
SODIUM SERPL-SCNC: 139 MMOL/L (ref 135–147)
TRIGL SERPL-MCNC: 52 MG/DL
WBC # BLD AUTO: 6.69 THOUSAND/UL (ref 4.31–10.16)

## 2023-05-31 PROCEDURE — 36415 COLL VENOUS BLD VENIPUNCTURE: CPT

## 2023-05-31 PROCEDURE — 85025 COMPLETE CBC W/AUTO DIFF WBC: CPT

## 2023-05-31 PROCEDURE — 80053 COMPREHEN METABOLIC PANEL: CPT

## 2023-05-31 PROCEDURE — 80061 LIPID PANEL: CPT

## 2023-05-31 NOTE — ASSESSMENT & PLAN NOTE
Care wellness completed  Patient is generally doing well, wife passed away more than 6 months ago  Does have visiting [place] 4 hours a day  Does have family support, reports family wants him to either go to nursing home or live with them  Patient is not ready to do that yet  Discussed maintaining safety at home  Patient does not cook or drive  He does have nurses aide appropriate  Lunch and dinner for him  He eats yogurt or fruit for breakfast   Referral made to neurology for continued management of dementia  Blood work ordered  Blood pressure is well controlled  Will call daughter discussed plan of care for patient    Advised also to bring in living well

## 2023-05-31 NOTE — ASSESSMENT & PLAN NOTE
Patient has mild dementia  Referral was made to neurology, needs a new referral   States that he was not sure that he is on Namenda  Medication reordered  Discussed maintaining safety

## 2023-06-01 ENCOUNTER — TELEPHONE (OUTPATIENT)
Dept: FAMILY MEDICINE CLINIC | Facility: CLINIC | Age: 83
End: 2023-06-01

## 2023-06-01 NOTE — TELEPHONE ENCOUNTER
----- Message from Daija 2 sent at 6/1/2023 12:54 PM EDT -----  Patient know blood work is good  Continue medication  Please asked patient if he has extended his visiting nurse hours to 8 hours  Please let me know    Thank you

## 2023-07-25 DIAGNOSIS — G30.0 MILD EARLY ONSET ALZHEIMER'S DEMENTIA, UNSPECIFIED WHETHER BEHAVIORAL, PSYCHOTIC, OR MOOD DISTURBANCE OR ANXIETY (HCC): ICD-10-CM

## 2023-07-25 DIAGNOSIS — F02.A0 MILD EARLY ONSET ALZHEIMER'S DEMENTIA, UNSPECIFIED WHETHER BEHAVIORAL, PSYCHOTIC, OR MOOD DISTURBANCE OR ANXIETY (HCC): ICD-10-CM

## 2023-07-25 RX ORDER — MEMANTINE HYDROCHLORIDE 10 MG/1
10 TABLET ORAL 2 TIMES DAILY
Qty: 90 TABLET | Refills: 0 | Status: SHIPPED | OUTPATIENT
Start: 2023-07-25 | End: 2023-07-25 | Stop reason: SDUPTHER

## 2023-07-26 RX ORDER — MEMANTINE HYDROCHLORIDE 10 MG/1
10 TABLET ORAL 2 TIMES DAILY
Qty: 90 TABLET | Refills: 0 | Status: SHIPPED | OUTPATIENT
Start: 2023-07-26

## 2023-07-27 ENCOUNTER — TELEPHONE (OUTPATIENT)
Dept: OBGYN CLINIC | Facility: CLINIC | Age: 83
End: 2023-07-27

## 2023-07-30 PROBLEM — Z00.00 MEDICARE ANNUAL WELLNESS VISIT, SUBSEQUENT: Status: RESOLVED | Noted: 2022-01-06 | Resolved: 2023-07-30

## 2023-08-14 DIAGNOSIS — F02.A0 MILD EARLY ONSET ALZHEIMER'S DEMENTIA, UNSPECIFIED WHETHER BEHAVIORAL, PSYCHOTIC, OR MOOD DISTURBANCE OR ANXIETY (HCC): ICD-10-CM

## 2023-08-14 DIAGNOSIS — G30.0 MILD EARLY ONSET ALZHEIMER'S DEMENTIA, UNSPECIFIED WHETHER BEHAVIORAL, PSYCHOTIC, OR MOOD DISTURBANCE OR ANXIETY (HCC): ICD-10-CM

## 2023-08-17 RX ORDER — MEMANTINE HYDROCHLORIDE 10 MG/1
10 TABLET ORAL 2 TIMES DAILY
Qty: 180 TABLET | Refills: 1 | Status: SHIPPED | OUTPATIENT
Start: 2023-08-17

## 2023-08-25 DIAGNOSIS — G30.0 MILD EARLY ONSET ALZHEIMER'S DEMENTIA, UNSPECIFIED WHETHER BEHAVIORAL, PSYCHOTIC, OR MOOD DISTURBANCE OR ANXIETY (HCC): ICD-10-CM

## 2023-08-25 DIAGNOSIS — F02.A0 MILD EARLY ONSET ALZHEIMER'S DEMENTIA, UNSPECIFIED WHETHER BEHAVIORAL, PSYCHOTIC, OR MOOD DISTURBANCE OR ANXIETY (HCC): ICD-10-CM

## 2023-08-28 ENCOUNTER — RA CDI HCC (OUTPATIENT)
Dept: OTHER | Facility: HOSPITAL | Age: 83
End: 2023-08-28

## 2023-08-28 RX ORDER — MEMANTINE HYDROCHLORIDE 10 MG/1
10 TABLET ORAL 2 TIMES DAILY
Qty: 180 TABLET | Refills: 1 | Status: SHIPPED | OUTPATIENT
Start: 2023-08-28 | End: 2023-08-30 | Stop reason: SDUPTHER

## 2023-08-28 NOTE — PROGRESS NOTES
[address] [facility] coding opportunities       Chart reviewed, no opportunity found: CHART REVIEWED, NO OPPORTUNITY FOUND        Patients Insurance     Medicare Insurance: Medicare

## 2023-08-30 ENCOUNTER — OFFICE VISIT (OUTPATIENT)
Dept: FAMILY MEDICINE CLINIC | Facility: CLINIC | Age: 83
End: 2023-08-30
Payer: MEDICARE

## 2023-08-30 VITALS
OXYGEN SATURATION: 96 % | BODY MASS INDEX: 22.99 KG/M2 | SYSTOLIC BLOOD PRESSURE: 132 MMHG | DIASTOLIC BLOOD PRESSURE: 80 MMHG | HEART RATE: 68 BPM | WEIGHT: 138 LBS | HEIGHT: 65 IN | RESPIRATION RATE: 16 BRPM | TEMPERATURE: 97.5 F

## 2023-08-30 DIAGNOSIS — F02.A0 MILD EARLY ONSET ALZHEIMER'S DEMENTIA, UNSPECIFIED WHETHER BEHAVIORAL, PSYCHOTIC, OR MOOD DISTURBANCE OR ANXIETY (HCC): ICD-10-CM

## 2023-08-30 DIAGNOSIS — E78.49 OTHER HYPERLIPIDEMIA: ICD-10-CM

## 2023-08-30 DIAGNOSIS — G30.0 MILD EARLY ONSET ALZHEIMER'S DEMENTIA, UNSPECIFIED WHETHER BEHAVIORAL, PSYCHOTIC, OR MOOD DISTURBANCE OR ANXIETY (HCC): ICD-10-CM

## 2023-08-30 DIAGNOSIS — R42 DIZZINESS: ICD-10-CM

## 2023-08-30 DIAGNOSIS — L40.9 PSORIASIS: ICD-10-CM

## 2023-08-30 DIAGNOSIS — I10 ESSENTIAL (PRIMARY) HYPERTENSION: Primary | ICD-10-CM

## 2023-08-30 LAB — SL AMB POCT GLUCOSE BLD: 87

## 2023-08-30 PROCEDURE — 99214 OFFICE O/P EST MOD 30 MIN: CPT | Performed by: NURSE PRACTITIONER

## 2023-08-30 PROCEDURE — 82948 REAGENT STRIP/BLOOD GLUCOSE: CPT | Performed by: NURSE PRACTITIONER

## 2023-08-30 RX ORDER — LISINOPRIL 10 MG/1
10 TABLET ORAL DAILY
Qty: 90 TABLET | Refills: 3 | Status: SHIPPED | OUTPATIENT
Start: 2023-08-30

## 2023-08-30 RX ORDER — ROSUVASTATIN CALCIUM 10 MG/1
5 TABLET, COATED ORAL DAILY
Qty: 90 TABLET | Refills: 1 | Status: SHIPPED | OUTPATIENT
Start: 2023-08-30 | End: 2023-09-08 | Stop reason: SDUPTHER

## 2023-08-30 RX ORDER — MOMETASONE FUROATE 1 MG/G
CREAM TOPICAL DAILY
Qty: 50 G | Refills: 3 | Status: SHIPPED | OUTPATIENT
Start: 2023-08-30

## 2023-08-30 RX ORDER — MEMANTINE HYDROCHLORIDE 10 MG/1
10 TABLET ORAL 2 TIMES DAILY
Qty: 180 TABLET | Refills: 1 | Status: SHIPPED | OUTPATIENT
Start: 2023-08-30

## 2023-08-30 NOTE — ASSESSMENT & PLAN NOTE
Blood pressure is at goal.  Patient did report dizziness. Blood sugar was 87. Reports he did not eat anything since 8 this morning. Discussed the importance of maintaining nutrition. Advised to set a reminder on his phone. Continue medications.   Blood work ordered

## 2023-08-30 NOTE — ASSESSMENT & PLAN NOTE
Patient reports feeling dizzy. Check blood sugar POC 87 blood pressure is within normal limits. Reports he did not eat anything since breakfast.  The provided orange juice and snack. Patient did have some improvement. Discussed maintaining safety.

## 2023-08-30 NOTE — PROGRESS NOTES
Name: Olivia Torres      : 1940      MRN: [id number]  Encounter Provider: MARIA A Mei  Encounter Date: 2023   Encounter department: [address] [facility]     1. Essential (primary) hypertension  Assessment & Plan:  Blood pressure is at goal.  Patient did report dizziness. Blood sugar was 87. Reports he did not eat anything since 8 this morning. Discussed the importance of maintaining nutrition. Advised to set a reminder on his phone. Continue medications. Blood work ordered    Orders:  -     lisinopril (ZESTRIL) 10 mg tablet; Take 1 tablet (10 mg total) by mouth daily  -     CBC and differential; Future; Expected date: 2023  -     Comprehensive metabolic panel; Future; Expected date: 2023  -     Lipid panel; Future; Expected date: 2023  -     Albumin / creatinine urine ratio    2. Mild early onset Alzheimer's dementia, unspecified whether behavioral, psychotic, or mood disturbance or anxiety [facility])  Assessment & Plan:  Patient has been living by himself. Does have visiting nurse come in from 2-6. Discussed maintaining safety. Patient reports that he swims daily. Discussed safety, patient reports that he does not swim in the deep, swims in really shallow water side to side. States that he would like to continue swimming may stop in the wintertime. Education provided    Orders:  -     memantine (NAMENDA) 10 mg tablet; Take 1 tablet (10 mg total) by mouth 2 (two) times a day    3. Other hyperlipidemia  -     rosuvastatin (CRESTOR) 10 MG tablet; Take 0.5 tablets (5 mg total) by mouth daily  -     Lipid panel; Future; Expected date: 2023    4. Psoriasis  -     mometasone (ELOCON) 0.1 % cream; Apply topically daily    5. Dizziness  Assessment & Plan:  Patient reports feeling dizzy. Check blood sugar POC 87 blood pressure is within normal limits. Reports he did not eat anything since breakfast.  The provided orange juice and snack. Patient did have some improvement. Discussed maintaining safety. Orders:  -     POCT blood glucose           Subjective      Patient is here for follow-up on chronic health issues. Continues to live by himself. Does have visiting [employer] coming at 2:00 to 6:00 daily. They make meals for him remind him to take his evening meds. Patient also continues to swim daily. Feels dizzy today reports that he has not eaten lunch. Review of Systems   Constitutional: Negative. HENT: Negative. Eyes: Negative. Respiratory: Negative. Cardiovascular: Negative. Gastrointestinal: Negative. Endocrine: Negative. Genitourinary: Negative. Musculoskeletal: Negative. Skin: Negative. Allergic/Immunologic: Negative. Neurological: Positive for seizures. Hematological: Negative. Psychiatric/Behavioral: Negative.         Current Outpatient Medications on File Prior to Visit   Medication Sig   • aspirin (ECOTRIN LOW STRENGTH) 81 mg EC tablet Take by mouth   • Aspirin Buf,CaCarb-MgCarb-MgO, 81 MG TABS Take by mouth   • bisacodyl (DULCOLAX) 5 mg EC tablet Take 1 tablet (5 mg total) by mouth daily as needed for constipation   • calcipotriene (DOVONEX) 0.005 % cream Apply topically   • calcium polycarbophil (FIBERCON) 625 mg tablet Take 1 tablet (625 mg total) by mouth daily   • CYANOCOBALAMIN PO Take by mouth   • meloxicam (MOBIC) 7.5 mg tablet meloxicam 7.5 mg tablet   TAKE 1 TABLET BY MOUTH EVERY DAY AS NEEDED   • [DISCONTINUED] lisinopril (ZESTRIL) 10 mg tablet Take 1 tablet (10 mg total) by mouth daily   • [DISCONTINUED] memantine (NAMENDA) 10 mg tablet Take 1 tablet (10 mg total) by mouth 2 (two) times a day   • [DISCONTINUED] rosuvastatin (CRESTOR) 10 MG tablet Take 0.5 tablets (5 mg total) by mouth daily       Objective     /80 (BP Location: Left arm, Patient Position: Sitting, Cuff Size: Adult)   Pulse 68   Temp 97.5 °F (36.4 °C)   Resp 16   Ht 5' 4.5" (1.638 m)   Wt 62.6 kg (138 lb) SpO2 96%   BMI 23.32 kg/m²     Physical Exam  Vitals and nursing note reviewed. Constitutional:       Appearance: Normal appearance. He is well-developed. HENT:      Head: Normocephalic and atraumatic. Cardiovascular:      Rate and Rhythm: Normal rate and regular rhythm. Pulses: Normal pulses. Heart sounds: Normal heart sounds. Abdominal:      Palpations: Abdomen is soft. Musculoskeletal:         General: Normal range of motion. Cervical back: Normal range of motion. Skin:     General: Skin is warm and dry. Neurological:      Mental Status: He is alert and oriented to person, place, and time. Mental status is at baseline. Psychiatric:         Mood and Affect: Mood normal.         Behavior: Behavior normal.         Thought Content:  Thought content normal.         Judgment: Judgment normal.       MARIA A Wu

## 2023-08-30 NOTE — ASSESSMENT & PLAN NOTE
Patient has been living by himself. Does have visiting nurse come in from 2-6. Discussed maintaining safety. Patient reports that he swims daily. Discussed safety, patient reports that he does not swim in the deep, swims in really shallow water side to side. States that he would like to continue swimming may stop in the wintertime.   Education provided

## 2023-09-08 DIAGNOSIS — E78.49 OTHER HYPERLIPIDEMIA: ICD-10-CM

## 2023-09-08 RX ORDER — ROSUVASTATIN CALCIUM 10 MG/1
5 TABLET, COATED ORAL DAILY
Qty: 90 TABLET | Refills: 1 | Status: SHIPPED | OUTPATIENT
Start: 2023-09-08 | End: 2023-09-14 | Stop reason: SDUPTHER

## 2023-09-11 DIAGNOSIS — I10 ESSENTIAL (PRIMARY) HYPERTENSION: ICD-10-CM

## 2023-09-11 RX ORDER — LISINOPRIL 10 MG/1
10 TABLET ORAL DAILY
Qty: 90 TABLET | Refills: 3 | Status: SHIPPED | OUTPATIENT
Start: 2023-09-11

## 2023-09-14 DIAGNOSIS — E78.49 OTHER HYPERLIPIDEMIA: ICD-10-CM

## 2023-09-14 RX ORDER — ROSUVASTATIN CALCIUM 10 MG/1
5 TABLET, COATED ORAL DAILY
Qty: 90 TABLET | Refills: 1 | Status: SHIPPED | OUTPATIENT
Start: 2023-09-14

## 2023-09-14 NOTE — TELEPHONE ENCOUNTER
Horace Lunsford We need a new prescription for his rap rabbit statin. His cholesterol pill? ROSUVASTATIN, his [birth year] is [birth year] and it's for Horace Lunsford and we needed a prescription refill at the at [address] [place] [address] in [place]. Thank you.

## 2023-10-01 NOTE — ASSESSMENT & PLAN NOTE
Patient brought in blood pressure readings  Blood pressure is stable  130s over 80s average  Continue medication  1) change TF rx to above - please switch pt to continuous TF for home w/ pump 2/2 high risk for aspiration, 2) Monitor tolerance; maintain STRICT aspiration precautions, back of bed >45 degrees, 3) daily wts to track/trend changes, 4) monitor lytes/ min and replete prn, 5) Monitor bowel movements, if no BM for >3 days, consider implementing bowel regimen, 6) Obtain vitamin D 25OH level to assess nutriture, 7) Consider adding thiamine 100 mg daily 2/2 poor PO intake/ malnutrition. RD will continue to monitor TF tolerance, labs, hydration, and wt prn.

## 2023-10-03 DIAGNOSIS — E78.49 OTHER HYPERLIPIDEMIA: ICD-10-CM

## 2023-10-03 RX ORDER — ROSUVASTATIN CALCIUM 10 MG/1
5 TABLET, COATED ORAL DAILY
Qty: 90 TABLET | Refills: 1 | Status: SHIPPED | OUTPATIENT
Start: 2023-10-03

## 2023-10-03 NOTE — TELEPHONE ENCOUNTER
Pt's Care giver Caitlyn Bethea walked in to ask for refill and if you can prescribe 5mg patient can not cut pills in half.  please advise, Thank you

## 2023-12-21 ENCOUNTER — TELEPHONE (OUTPATIENT)
Dept: FAMILY MEDICINE CLINIC | Facility: CLINIC | Age: 83
End: 2023-12-21

## 2023-12-21 NOTE — TELEPHONE ENCOUNTER
"Cyn from the [facility] wants to know \"Does the patient have the capacity to make informed decisions?\" Please give her call back at [phone].  "

## 2023-12-28 NOTE — TELEPHONE ENCOUNTER
Marisel from [facility] called again for the status of faxed paperwork regarding the patient.

## 2024-01-09 NOTE — TELEPHONE ENCOUNTER
Marisel from the [facility] called again in regards to the patient and paperwork that was faxed over . Please give her a call back at [phone].

## 2024-01-14 DIAGNOSIS — F02.A0 MILD EARLY ONSET ALZHEIMER'S DEMENTIA, UNSPECIFIED WHETHER BEHAVIORAL, PSYCHOTIC, OR MOOD DISTURBANCE OR ANXIETY (HCC): ICD-10-CM

## 2024-01-14 DIAGNOSIS — G30.0 MILD EARLY ONSET ALZHEIMER'S DEMENTIA, UNSPECIFIED WHETHER BEHAVIORAL, PSYCHOTIC, OR MOOD DISTURBANCE OR ANXIETY (HCC): ICD-10-CM

## 2024-01-15 RX ORDER — MEMANTINE HYDROCHLORIDE 10 MG/1
10 TABLET ORAL 2 TIMES DAILY
Qty: 180 TABLET | Refills: 1 | Status: SHIPPED | OUTPATIENT
Start: 2024-01-15

## 2024-01-23 ENCOUNTER — TELEPHONE (OUTPATIENT)
Dept: GASTROENTEROLOGY | Facility: CLINIC | Age: 84
End: 2024-01-23

## 2024-01-23 NOTE — TELEPHONE ENCOUNTER
Schedule ov to discuss colonoscopy - hx of serrated adenoma/hx of polyps - Dr Wolfe pt.  I lmom for pt to please call back to schedule an ov to discuss a colonoscopy.  Will call pt again if do not hear back from him.

## 2024-01-30 NOTE — TELEPHONE ENCOUNTER
Called and spoke to pt to try to schedule on ov to discuss a repeat colonoscopy due to diagnosis history and his age.  Pt was very confused.  I advise that he should speak to his pcp to see what she suggest.  Pt will do so.

## 2024-02-01 ENCOUNTER — RA CDI HCC (OUTPATIENT)
Dept: OTHER | Facility: HOSPITAL | Age: 84
End: 2024-02-01

## 2024-02-08 ENCOUNTER — OFFICE VISIT (OUTPATIENT)
Dept: FAMILY MEDICINE CLINIC | Facility: CLINIC | Age: 84
End: 2024-02-08
Payer: MEDICARE

## 2024-02-08 VITALS
RESPIRATION RATE: 14 BRPM | BODY MASS INDEX: 22.66 KG/M2 | OXYGEN SATURATION: 97 % | WEIGHT: 136 LBS | DIASTOLIC BLOOD PRESSURE: 68 MMHG | HEIGHT: 65 IN | SYSTOLIC BLOOD PRESSURE: 116 MMHG | TEMPERATURE: 97.5 F | HEART RATE: 83 BPM

## 2024-02-08 DIAGNOSIS — C61 PROSTATE CANCER (HCC): ICD-10-CM

## 2024-02-08 DIAGNOSIS — K59.00 CONSTIPATION, UNSPECIFIED CONSTIPATION TYPE: ICD-10-CM

## 2024-02-08 DIAGNOSIS — E78.49 OTHER HYPERLIPIDEMIA: ICD-10-CM

## 2024-02-08 DIAGNOSIS — G30.9 SEVERE ALZHEIMER'S DEMENTIA, UNSPECIFIED TIMING OF DEMENTIA ONSET, UNSPECIFIED WHETHER BEHAVIORAL, PSYCHOTIC, OR MOOD DISTURBANCE OR ANXIETY (HCC): Primary | ICD-10-CM

## 2024-02-08 DIAGNOSIS — F02.C0 SEVERE ALZHEIMER'S DEMENTIA, UNSPECIFIED TIMING OF DEMENTIA ONSET, UNSPECIFIED WHETHER BEHAVIORAL, PSYCHOTIC, OR MOOD DISTURBANCE OR ANXIETY (HCC): Primary | ICD-10-CM

## 2024-02-08 DIAGNOSIS — I10 ESSENTIAL (PRIMARY) HYPERTENSION: ICD-10-CM

## 2024-02-08 PROCEDURE — 99214 OFFICE O/P EST MOD 30 MIN: CPT | Performed by: NURSE PRACTITIONER

## 2024-02-08 PROCEDURE — G2211 COMPLEX E/M VISIT ADD ON: HCPCS | Performed by: NURSE PRACTITIONER

## 2024-02-08 RX ORDER — POLYETHYLENE GLYCOL 3350 17 G/17G
17 POWDER, FOR SOLUTION ORAL DAILY
Qty: 850 G | Refills: 1 | Status: SHIPPED | OUTPATIENT
Start: 2024-02-08

## 2024-02-08 NOTE — PROGRESS NOTES
Name: Junior Huang      : 1940      MRN: [id number]  Encounter Provider: MARIA A Stover  Encounter Date: 2024   Encounter department: [facility]    Assessment & Plan     1. Severe Alzheimer's dementia, unspecified timing of dementia onset, unspecified whether behavioral, psychotic, or mood disturbance or anxiety (HCC)  Assessment & Plan:  Patient has a history of dementia.  Is on Namenda unknown if patient is taking medication.  Patient lives by himself.  Does have a health aide coming in for 4 hours daily.  There are  concerns regarding his mental capacity for making decisions.  Mini mental test completed in office.  Patient scored a 6 will scanned in chart.  Discussed maintaining safety.  Patient's kids do not live close to him.  Discussed with patient long-term plans regarding independent living.  Patient is not receptive feels like he is safe to live by himself.  Referral made to neuropsychiatry for assessment for mental capacity of executive decisions.    Orders:  -     Ambulatory Referral to Neuropsychology; Future    2. Constipation, unspecified constipation type  Assessment & Plan:  Patient continues to have problems with constipation.  Discussed maintaining good fluid intake, fiber intake.  MiraLAX ordered continue with exercises.    Orders:  -     polyethylene glycol (GLYCOLAX) 17 GM/SCOOP powder; Take 17 g by mouth daily (Patient not taking: Reported on 2024)    3. Prostate cancer (HCC)    4. Other hyperlipidemia  Assessment & Plan:  Patient reports he has been taking his rosuvastatin.  Continue medication.  Lipid panel ordered    Orders:  -     Lipid panel; Future    5. Essential (primary) hypertension  Assessment & Plan:  Patient's blood pressure is at goal.  Patient does report he has been taking the lisinopril.  Continue with low-salt heart healthy diet.    Orders:  -     CBC and differential; Future  -     Comprehensive metabolic panel; Future  -      Albumin / creatinine urine ratio; Future      Depression Screening and Follow-up Plan: Patient was screened for depression during today's encounter. They screened negative with a PHQ-2 score of 0.    Falls Plan of Care: balance, strength, and gait training instructions were provided. Cognitive screening performed.         Subjective      Patient is being seen for follow-up on chronic health conditions.  Currently lives alone.  Does have a history of dementia.      Review of Systems   Constitutional: Negative.    HENT: Negative.     Eyes: Negative.    Respiratory: Negative.     Cardiovascular: Negative.    Gastrointestinal: Negative.    Endocrine: Negative.    Genitourinary: Negative.    Musculoskeletal: Negative.    Skin: Negative.    Allergic/Immunologic: Negative.    Neurological: Negative.    Hematological: Negative.    Psychiatric/Behavioral:  Positive for decreased concentration. Negative for agitation, behavioral problems, dysphoric mood, hallucinations and sleep disturbance.        Current Outpatient Medications on File Prior to Visit   Medication Sig   • bisacodyl (DULCOLAX) 5 mg EC tablet Take 1 tablet (5 mg total) by mouth daily as needed for constipation   • calcipotriene (DOVONEX) 0.005 % cream Apply topically   • calcium polycarbophil (FIBERCON) 625 mg tablet Take 1 tablet (625 mg total) by mouth daily   • CYANOCOBALAMIN PO Take by mouth (Patient not taking: Reported on 2/29/2024)   • lisinopril (ZESTRIL) 10 mg tablet TAKE 1 TABLET BY MOUTH EVERY DAY   • memantine (NAMENDA) 10 mg tablet TAKE 1 TABLET BY MOUTH TWICE A DAY   • mometasone (ELOCON) 0.1 % cream Apply topically daily   • aspirin (ECOTRIN LOW STRENGTH) 81 mg EC tablet Take by mouth (Patient not taking: Reported on 2/8/2024)   • Aspirin Buf,CaCarb-MgCarb-MgO, 81 MG TABS Take by mouth (Patient not taking: Reported on 2/8/2024)   • meloxicam (MOBIC) 7.5 mg tablet meloxicam 7.5 mg tablet   TAKE 1 TABLET BY MOUTH EVERY DAY AS NEEDED (Patient not  "taking: Reported on 2/8/2024)       Objective     /68   Pulse 83   Temp 97.5 °F (36.4 °C) (Temporal)   Resp 14   Ht 5' 4.5\" (1.638 m)   Wt 61.7 kg (136 lb)   SpO2 97%   BMI 22.98 kg/m²     Physical Exam  Vitals and nursing note reviewed.   Constitutional:       Appearance: He is well-developed.   Cardiovascular:      Rate and Rhythm: Normal rate and regular rhythm.   Abdominal:      Palpations: Abdomen is soft.   Musculoskeletal:         General: Normal range of motion.   Skin:     General: Skin is warm and dry.   Neurological:      Mental Status: He is alert and oriented to person, place, and time.      Sensory: Sensory deficit present.   Psychiatric:         Attention and Perception: Attention normal.         Mood and Affect: Mood normal.         Speech: Speech normal.         Behavior: Behavior normal. Behavior is cooperative.         Thought Content: Thought content normal.         Cognition and Memory: Cognition is not impaired. Memory is impaired. He exhibits impaired recent memory and impaired remote memory.         Judgment: Judgment normal.       MARIA A Stover    "

## 2024-02-09 ENCOUNTER — TELEPHONE (OUTPATIENT)
Dept: NEUROLOGY | Facility: CLINIC | Age: 84
End: 2024-02-09

## 2024-02-09 NOTE — TELEPHONE ENCOUNTER
Patient and [place] called in to schedule appointment.    Patient was triaged triage was sent and is awaiting response to schedule accordingly.

## 2024-02-12 ENCOUNTER — APPOINTMENT (OUTPATIENT)
Dept: LAB | Facility: CLINIC | Age: 84
End: 2024-02-12
Payer: MEDICARE

## 2024-02-12 DIAGNOSIS — I10 ESSENTIAL (PRIMARY) HYPERTENSION: ICD-10-CM

## 2024-02-12 DIAGNOSIS — E78.49 OTHER HYPERLIPIDEMIA: ICD-10-CM

## 2024-02-12 LAB
ALBUMIN SERPL BCP-MCNC: 4.3 G/DL (ref 3.5–5)
ALP SERPL-CCNC: 46 U/L (ref 34–104)
ALT SERPL W P-5'-P-CCNC: 16 U/L (ref 7–52)
ANION GAP SERPL CALCULATED.3IONS-SCNC: 7 MMOL/L
AST SERPL W P-5'-P-CCNC: 20 U/L (ref 13–39)
BASOPHILS # BLD AUTO: 0.03 THOUSANDS/ÂΜL (ref 0–0.1)
BASOPHILS NFR BLD AUTO: 1 % (ref 0–1)
BILIRUB SERPL-MCNC: 0.73 MG/DL (ref 0.2–1)
BUN SERPL-MCNC: 25 MG/DL (ref 5–25)
CALCIUM SERPL-MCNC: 9.2 MG/DL (ref 8.4–10.2)
CHLORIDE SERPL-SCNC: 106 MMOL/L (ref 96–108)
CHOLEST SERPL-MCNC: 159 MG/DL
CO2 SERPL-SCNC: 30 MMOL/L (ref 21–32)
CREAT SERPL-MCNC: 0.77 MG/DL (ref 0.6–1.3)
CREAT UR-MCNC: 184.6 MG/DL
EOSINOPHIL # BLD AUTO: 0.11 THOUSAND/ÂΜL (ref 0–0.61)
EOSINOPHIL NFR BLD AUTO: 2 % (ref 0–6)
ERYTHROCYTE [DISTWIDTH] IN BLOOD BY AUTOMATED COUNT: 13.1 % (ref 11.6–15.1)
GFR SERPL CREATININE-BSD FRML MDRD: 83 ML/MIN/1.73SQ M
GLUCOSE SERPL-MCNC: 82 MG/DL (ref 65–140)
HCT VFR BLD AUTO: 41.7 % (ref 36.5–49.3)
HDLC SERPL-MCNC: 65 MG/DL
HGB BLD-MCNC: 13.9 G/DL (ref 12–17)
IMM GRANULOCYTES # BLD AUTO: 0.02 THOUSAND/UL (ref 0–0.2)
IMM GRANULOCYTES NFR BLD AUTO: 0 % (ref 0–2)
LDLC SERPL CALC-MCNC: 82 MG/DL (ref 0–100)
LYMPHOCYTES # BLD AUTO: 2.05 THOUSANDS/ÂΜL (ref 0.6–4.47)
LYMPHOCYTES NFR BLD AUTO: 35 % (ref 14–44)
MCH RBC QN AUTO: 31.7 PG (ref 26.8–34.3)
MCHC RBC AUTO-ENTMCNC: 33.3 G/DL (ref 31.4–37.4)
MCV RBC AUTO: 95 FL (ref 82–98)
MICROALBUMIN UR-MCNC: 82.6 MG/L
MICROALBUMIN/CREAT 24H UR: 45 MG/G CREATININE (ref 0–30)
MONOCYTES # BLD AUTO: 0.5 THOUSAND/ÂΜL (ref 0.17–1.22)
MONOCYTES NFR BLD AUTO: 9 % (ref 4–12)
NEUTROPHILS # BLD AUTO: 3.19 THOUSANDS/ÂΜL (ref 1.85–7.62)
NEUTS SEG NFR BLD AUTO: 53 % (ref 43–75)
NONHDLC SERPL-MCNC: 94 MG/DL
NRBC BLD AUTO-RTO: 0 /100 WBCS
PLATELET # BLD AUTO: 252 THOUSANDS/UL (ref 149–390)
PMV BLD AUTO: 9.9 FL (ref 8.9–12.7)
POTASSIUM SERPL-SCNC: 4.3 MMOL/L (ref 3.5–5.3)
PROT SERPL-MCNC: 7.2 G/DL (ref 6.4–8.4)
RBC # BLD AUTO: 4.39 MILLION/UL (ref 3.88–5.62)
SODIUM SERPL-SCNC: 143 MMOL/L (ref 135–147)
TRIGL SERPL-MCNC: 59 MG/DL
WBC # BLD AUTO: 5.9 THOUSAND/UL (ref 4.31–10.16)

## 2024-02-12 PROCEDURE — 85025 COMPLETE CBC W/AUTO DIFF WBC: CPT

## 2024-02-12 PROCEDURE — 36415 COLL VENOUS BLD VENIPUNCTURE: CPT

## 2024-02-12 PROCEDURE — 80053 COMPREHEN METABOLIC PANEL: CPT

## 2024-02-12 PROCEDURE — 80061 LIPID PANEL: CPT

## 2024-02-15 DIAGNOSIS — E78.49 OTHER HYPERLIPIDEMIA: ICD-10-CM

## 2024-02-16 RX ORDER — ROSUVASTATIN CALCIUM 10 MG/1
5 TABLET, COATED ORAL DAILY
Qty: 90 TABLET | Refills: 1 | Status: SHIPPED | OUTPATIENT
Start: 2024-02-16

## 2024-02-21 ENCOUNTER — APPOINTMENT (EMERGENCY)
Dept: CT IMAGING | Facility: HOSPITAL | Age: 84
End: 2024-02-21
Payer: MEDICARE

## 2024-02-21 ENCOUNTER — HOSPITAL ENCOUNTER (EMERGENCY)
Facility: HOSPITAL | Age: 84
Discharge: HOME | End: 2024-02-21
Attending: EMERGENCY MEDICINE
Payer: MEDICARE

## 2024-02-21 ENCOUNTER — TELEPHONE (OUTPATIENT)
Dept: FAMILY MEDICINE CLINIC | Facility: CLINIC | Age: 84
End: 2024-02-21

## 2024-02-21 VITALS
RESPIRATION RATE: 20 BRPM | OXYGEN SATURATION: 98 % | HEART RATE: 60 BPM | DIASTOLIC BLOOD PRESSURE: 81 MMHG | SYSTOLIC BLOOD PRESSURE: 176 MMHG | TEMPERATURE: 97.7 F

## 2024-02-21 DIAGNOSIS — R31.9 HEMATURIA: Primary | ICD-10-CM

## 2024-02-21 DIAGNOSIS — N30.90 CYSTITIS: ICD-10-CM

## 2024-02-21 LAB
ALBUMIN SERPL BCP-MCNC: 4.2 G/DL (ref 3.5–5)
ALP SERPL-CCNC: 44 U/L (ref 34–104)
ALT SERPL W P-5'-P-CCNC: 13 U/L (ref 7–52)
ANION GAP SERPL CALCULATED.3IONS-SCNC: 6 MMOL/L
APTT PPP: 25 SECONDS (ref 23–37)
AST SERPL W P-5'-P-CCNC: 16 U/L (ref 13–39)
BACTERIA UR QL AUTO: ABNORMAL /HPF
BASOPHILS # BLD AUTO: 0.04 THOUSANDS/ÂΜL (ref 0–0.1)
BASOPHILS NFR BLD AUTO: 1 % (ref 0–1)
BILIRUB SERPL-MCNC: 0.41 MG/DL (ref 0.2–1)
BILIRUB UR QL STRIP: NEGATIVE
BUN SERPL-MCNC: 23 MG/DL (ref 5–25)
CALCIUM SERPL-MCNC: 9.3 MG/DL (ref 8.4–10.2)
CHLORIDE SERPL-SCNC: 109 MMOL/L (ref 96–108)
CLARITY UR: ABNORMAL
CO2 SERPL-SCNC: 28 MMOL/L (ref 21–32)
COLOR UR: YELLOW
CREAT SERPL-MCNC: 0.8 MG/DL (ref 0.6–1.3)
EOSINOPHIL # BLD AUTO: 0.16 THOUSAND/ÂΜL (ref 0–0.61)
EOSINOPHIL NFR BLD AUTO: 2 % (ref 0–6)
ERYTHROCYTE [DISTWIDTH] IN BLOOD BY AUTOMATED COUNT: 12.9 % (ref 11.6–15.1)
GFR SERPL CREATININE-BSD FRML MDRD: 82 ML/MIN/1.73SQ M
GLUCOSE SERPL-MCNC: 101 MG/DL (ref 65–140)
GLUCOSE UR STRIP-MCNC: NEGATIVE MG/DL
HCT VFR BLD AUTO: 40 % (ref 36.5–49.3)
HGB BLD-MCNC: 13.1 G/DL (ref 12–17)
HGB UR QL STRIP.AUTO: ABNORMAL
IMM GRANULOCYTES # BLD AUTO: 0.03 THOUSAND/UL (ref 0–0.2)
IMM GRANULOCYTES NFR BLD AUTO: 0 % (ref 0–2)
INR PPP: 0.96 (ref 0.84–1.19)
KETONES UR STRIP-MCNC: NEGATIVE MG/DL
LEUKOCYTE ESTERASE UR QL STRIP: ABNORMAL
LYMPHOCYTES # BLD AUTO: 2.2 THOUSANDS/ÂΜL (ref 0.6–4.47)
LYMPHOCYTES NFR BLD AUTO: 29 % (ref 14–44)
MCH RBC QN AUTO: 30.8 PG (ref 26.8–34.3)
MCHC RBC AUTO-ENTMCNC: 32.8 G/DL (ref 31.4–37.4)
MCV RBC AUTO: 94 FL (ref 82–98)
MONOCYTES # BLD AUTO: 0.57 THOUSAND/ÂΜL (ref 0.17–1.22)
MONOCYTES NFR BLD AUTO: 8 % (ref 4–12)
MUCOUS THREADS UR QL AUTO: ABNORMAL
NEUTROPHILS # BLD AUTO: 4.48 THOUSANDS/ÂΜL (ref 1.85–7.62)
NEUTS SEG NFR BLD AUTO: 60 % (ref 43–75)
NITRITE UR QL STRIP: NEGATIVE
NON-SQ EPI CELLS URNS QL MICRO: ABNORMAL /HPF
NRBC BLD AUTO-RTO: 0 /100 WBCS
PH UR STRIP.AUTO: 5.5 [PH]
PLATELET # BLD AUTO: 234 THOUSANDS/UL (ref 149–390)
PMV BLD AUTO: 9.3 FL (ref 8.9–12.7)
POTASSIUM SERPL-SCNC: 3.4 MMOL/L (ref 3.5–5.3)
PROT SERPL-MCNC: 6.6 G/DL (ref 6.4–8.4)
PROT UR STRIP-MCNC: ABNORMAL MG/DL
PROTHROMBIN TIME: 13.4 SECONDS (ref 11.6–14.5)
RBC # BLD AUTO: 4.26 MILLION/UL (ref 3.88–5.62)
RBC #/AREA URNS AUTO: ABNORMAL /HPF
SODIUM SERPL-SCNC: 143 MMOL/L (ref 135–147)
SP GR UR STRIP.AUTO: 1.03 (ref 1–1.03)
UROBILINOGEN UR STRIP-ACNC: <2 MG/DL
WBC # BLD AUTO: 7.48 THOUSAND/UL (ref 4.31–10.16)
WBC #/AREA URNS AUTO: ABNORMAL /HPF

## 2024-02-21 PROCEDURE — 99285 EMERGENCY DEPT VISIT HI MDM: CPT | Performed by: EMERGENCY MEDICINE

## 2024-02-21 PROCEDURE — 87147 CULTURE TYPE IMMUNOLOGIC: CPT | Performed by: EMERGENCY MEDICINE

## 2024-02-21 PROCEDURE — 85610 PROTHROMBIN TIME: CPT | Performed by: EMERGENCY MEDICINE

## 2024-02-21 PROCEDURE — 74177 CT ABD & PELVIS W/CONTRAST: CPT

## 2024-02-21 PROCEDURE — 80053 COMPREHEN METABOLIC PANEL: CPT | Performed by: EMERGENCY MEDICINE

## 2024-02-21 PROCEDURE — 87086 URINE CULTURE/COLONY COUNT: CPT | Performed by: EMERGENCY MEDICINE

## 2024-02-21 PROCEDURE — 36415 COLL VENOUS BLD VENIPUNCTURE: CPT | Performed by: EMERGENCY MEDICINE

## 2024-02-21 PROCEDURE — 85730 THROMBOPLASTIN TIME PARTIAL: CPT | Performed by: EMERGENCY MEDICINE

## 2024-02-21 PROCEDURE — 81001 URINALYSIS AUTO W/SCOPE: CPT | Performed by: EMERGENCY MEDICINE

## 2024-02-21 PROCEDURE — 85025 COMPLETE CBC W/AUTO DIFF WBC: CPT | Performed by: EMERGENCY MEDICINE

## 2024-02-21 PROCEDURE — 96360 HYDRATION IV INFUSION INIT: CPT

## 2024-02-21 PROCEDURE — 99284 EMERGENCY DEPT VISIT MOD MDM: CPT

## 2024-02-21 RX ORDER — CEPHALEXIN 250 MG/1
500 CAPSULE ORAL ONCE
Status: COMPLETED | OUTPATIENT
Start: 2024-02-21 | End: 2024-02-21

## 2024-02-21 RX ORDER — CEPHALEXIN 250 MG/1
500 CAPSULE ORAL 4 TIMES DAILY
Qty: 56 CAPSULE | Refills: 0 | Status: SHIPPED | OUTPATIENT
Start: 2024-02-21 | End: 2024-02-29

## 2024-02-21 RX ADMIN — CEPHALEXIN 500 MG: 250 CAPSULE ORAL at 21:05

## 2024-02-21 RX ADMIN — IOHEXOL 100 ML: 350 INJECTION, SOLUTION INTRAVENOUS at 19:37

## 2024-02-21 RX ADMIN — SODIUM CHLORIDE 1000 ML: 0.9 INJECTION, SOLUTION INTRAVENOUS at 18:23

## 2024-02-21 NOTE — ED PROVIDER NOTES
"History  Chief Complaint   Patient presents with    Blood in Urine     Reports this morning had a large amount of blood in his urine but none since. Reports his RLQ feels \"funny\" since yesterday       HPI  84 yo M presents with blood in urine.  States this morning he urinated and had bright red blood in his urine.  Has not urinated since then.  He also reports a strange\" sensation in his right lower quadrant that started yesterday, denies pain.  Per chart review, patient with history of prostate cancer status post radiation but patient does not remember being diagnosed with cancer, does remember having a problem with his prostate but this was years ago.  Prior to Admission Medications   Prescriptions Last Dose Informant Patient Reported? Taking?   Aspirin Buf,CaCarb-MgCarb-MgO, 81 MG TABS   Yes No   Sig: Take by mouth   Patient not taking: Reported on 2/8/2024   CYANOCOBALAMIN PO  Self Yes No   Sig: Take by mouth   aspirin (ECOTRIN LOW STRENGTH) 81 mg EC tablet  Self Yes No   Sig: Take by mouth   Patient not taking: Reported on 2/8/2024   bisacodyl (DULCOLAX) 5 mg EC tablet   No No   Sig: Take 1 tablet (5 mg total) by mouth daily as needed for constipation   calcipotriene (DOVONEX) 0.005 % cream   Yes No   Sig: Apply topically   calcium polycarbophil (FIBERCON) 625 mg tablet   No No   Sig: Take 1 tablet (625 mg total) by mouth daily   lisinopril (ZESTRIL) 10 mg tablet   No No   Sig: TAKE 1 TABLET BY MOUTH EVERY DAY   meloxicam (MOBIC) 7.5 mg tablet   Yes No   Sig: meloxicam 7.5 mg tablet   TAKE 1 TABLET BY MOUTH EVERY DAY AS NEEDED   Patient not taking: Reported on 2/8/2024   memantine (NAMENDA) 10 mg tablet   No No   Sig: TAKE 1 TABLET BY MOUTH TWICE A DAY   mometasone (ELOCON) 0.1 % cream   No No   Sig: Apply topically daily   polyethylene glycol (GLYCOLAX) 17 GM/SCOOP powder   No No   Sig: Take 17 g by mouth daily   rosuvastatin (CRESTOR) 10 MG tablet   No No   Sig: Take 0.5 tablets (5 mg total) by mouth daily    "   Facility-Administered Medications: None       Past Medical History:   Diagnosis Date    Memory loss     Personal history of irradiation     Prostate cancer (HCC)        Past Surgical History:   Procedure Laterality Date    PROSTATE BIOPSY         Family History   Problem Relation Age of Onset    Colon cancer Mother     Nephrolithiasis Father     Stroke Father     Heart disease Father      I have reviewed and agree with the history as documented.    E-Cigarette/Vaping    E-Cigarette Use Never User      E-Cigarette/Vaping Substances    Nicotine No     THC No     CBD No     Flavoring No     Other No     Unknown No      Social History     Tobacco Use    Smoking status: Never    Smokeless tobacco: Never   Vaping Use    Vaping status: Never Used   Substance Use Topics    Alcohol use: Yes     Comment: 2/day    Drug use: No       Review of Systems   Constitutional:  Negative for chills and fever.   HENT:  Negative for dental problem and ear pain.    Eyes:  Negative for pain and redness.   Respiratory:  Negative for cough and shortness of breath.    Cardiovascular:  Negative for chest pain and palpitations.   Gastrointestinal:  Negative for abdominal pain and nausea.   Endocrine: Negative for polydipsia and polyphagia.   Genitourinary:  Positive for hematuria. Negative for dysuria and frequency.   Musculoskeletal:  Negative for arthralgias and joint swelling.   Skin:  Negative for color change and rash.   Neurological:  Negative for dizziness and headaches.   Psychiatric/Behavioral:  Negative for behavioral problems and confusion.    All other systems reviewed and are negative.      Physical Exam  Physical Exam  Vitals and nursing note reviewed.   Constitutional:       General: He is not in acute distress.     Appearance: He is well-developed. He is not diaphoretic.   HENT:      Head: Atraumatic.      Right Ear: External ear normal.      Left Ear: External ear normal.      Nose: Nose normal.   Eyes:       Conjunctiva/sclera: Conjunctivae normal.      Pupils: Pupils are equal, round, and reactive to light.   Neck:      Vascular: No JVD.   Cardiovascular:      Rate and Rhythm: Normal rate and regular rhythm.      Heart sounds: Normal heart sounds. No murmur heard.  Pulmonary:      Effort: Pulmonary effort is normal. No respiratory distress.      Breath sounds: Normal breath sounds. No wheezing.   Abdominal:      General: Bowel sounds are normal. There is no distension.      Palpations: Abdomen is soft.      Tenderness: There is no abdominal tenderness.   Musculoskeletal:         General: Normal range of motion.      Cervical back: Normal range of motion and neck supple.   Skin:     General: Skin is warm and dry.      Capillary Refill: Capillary refill takes less than 2 seconds.   Neurological:      Mental Status: He is alert and oriented to person, place, and time.      Cranial Nerves: No cranial nerve deficit.   Psychiatric:         Behavior: Behavior normal.         Vital Signs  ED Triage Vitals [02/21/24 1742]   Temperature Pulse Respirations Blood Pressure SpO2   97.7 °F (36.5 °C) 60 20 (!) 176/81 98 %      Temp Source Heart Rate Source Patient Position - Orthostatic VS BP Location FiO2 (%)   Temporal Monitor Sitting Left arm --      Pain Score       --           Vitals:    02/21/24 1742   BP: (!) 176/81   Pulse: 60   Patient Position - Orthostatic VS: Sitting         Visual Acuity      ED Medications  Medications   sodium chloride 0.9 % bolus 1,000 mL (0 mL Intravenous Stopped 2/21/24 1915)   iohexol (OMNIPAQUE) 350 MG/ML injection (MULTI-DOSE) 100 mL (100 mL Intravenous Given 2/21/24 1937)   cephalexin (KEFLEX) capsule 500 mg (500 mg Oral Given 2/21/24 2105)       Diagnostic Studies  Results Reviewed       Procedure Component Value Units Date/Time    Urine Microscopic [[id number]]  (Abnormal) Collected: 02/21/24 1935    Lab Status: Final result Specimen: Urine, Clean Catch Updated: 02/21/24 1952     RBC, UA  Innumerable /hpf      WBC, UA Innumerable /hpf      Epithelial Cells Occasional /hpf      Bacteria, UA None Seen /hpf      MUCUS THREADS Occasional    Urine culture [[id number]] Collected: 02/21/24 1935    Lab Status: In process Specimen: Urine, Clean Catch Updated: 02/21/24 1952    UA w Reflex to Microscopic w Reflex to Culture [[id number]]  (Abnormal) Collected: 02/21/24 1935    Lab Status: Final result Specimen: Urine, Clean Catch Updated: 02/21/24 1949     Color, UA Yellow     Clarity, UA Turbid     Specific Gravity, UA 1.026     pH, UA 5.5     Leukocytes, UA Moderate     Nitrite, UA Negative     Protein, UA Trace mg/dl      Glucose, UA Negative mg/dl      Ketones, UA Negative mg/dl      Urobilinogen, UA <2.0 mg/dl      Bilirubin, UA Negative     Occult Blood, UA Large    Comprehensive metabolic panel [[id number]]  (Abnormal) Collected: 02/21/24 1823    Lab Status: Final result Specimen: Blood from Arm, Right Updated: 02/21/24 1843     Sodium 143 mmol/L      Potassium 3.4 mmol/L      Chloride 109 mmol/L      CO2 28 mmol/L      ANION GAP 6 mmol/L      BUN 23 mg/dL      Creatinine 0.80 mg/dL      Glucose 101 mg/dL      Calcium 9.3 mg/dL      AST 16 U/L      ALT 13 U/L      Alkaline Phosphatase 44 U/L      Total Protein 6.6 g/dL      Albumin 4.2 g/dL      Total Bilirubin 0.41 mg/dL      eGFR 82 ml/min/1.73sq m     Narrative:      National Kid[employer] Foundation guidelines for Chronic Kidney Disease (CKD):     Stage 1 with normal or high GFR (GFR > 90 mL/min/1.73 square meters)    Stage 2 Mild CKD (GFR = 60-89 mL/min/1.73 square meters)    Stage 3A Moderate CKD (GFR = 45-59 mL/min/1.73 square meters)    Stage 3B Moderate CKD (GFR = 30-44 mL/min/1.73 square meters)    Stage 4 Severe CKD (GFR = 15-29 mL/min/1.73 square meters)    Stage 5 End Stage CKD (GFR <15 mL/min/1.73 square meters)  Note: GFR calculation is accurate only with a steady state creatinine    APTT [[id number]]  (Normal) Collected: 02/21/24 1823    Lab  Status: Final result Specimen: Blood from Arm, Right Updated: 02/21/24 1842     PTT 25 seconds     Protime-INR [[id number]]  (Normal) Collected: 02/21/24 1823    Lab Status: Final result Specimen: Blood from Arm, Right Updated: 02/21/24 1842     Protime 13.4 seconds      INR 0.96    CBC and differential [[id number]] Collected: 02/21/24 1823    Lab Status: Final result Specimen: Blood from Arm, Right Updated: 02/21/24 1830     WBC 7.48 Thousand/uL      RBC 4.26 Million/uL      Hemoglobin 13.1 g/dL      Hematocrit 40.0 %      MCV 94 fL      MCH 30.8 pg      MCHC 32.8 g/dL      RDW 12.9 %      MPV 9.3 fL      Platelets 234 Thousands/uL      nRBC 0 /100 WBCs      Neutrophils Relative 60 %      Immat GRANS % 0 %      Lymphocytes Relative 29 %      Monocytes Relative 8 %      Eosinophils Relative 2 %      Basophils Relative 1 %      Neutrophils Absolute 4.48 Thousands/µL      Immature Grans Absolute 0.03 Thousand/uL      Lymphocytes Absolute 2.20 Thousands/µL      Monocytes Absolute 0.57 Thousand/µL      Eosinophils Absolute 0.16 Thousand/µL      Basophils Absolute 0.04 Thousands/µL                    CT abdomen pelvis with contrast   Final Result by Marty Eid MD (02/21 2053)      1. Bladder is underdistended but suggestion of diffuse bladder wall thickening. Correlate for possible cystitis.   2. No acute findings of the kidneys. Tiny left intrarenal calculi.   3. Tortuous and ectatic abdominal aorta. 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               Workstation performed: [id number]                    Procedures  Procedures         ED Course               Identification of Seniors at Risk      Flowsheet Row Most Recent Value   (ISAR) Identification of Seniors at Risk    Before the illness or injury that brought you to the Emergency, did you need someone to help you on a regular basis? 0 Filed at: 02/21/2024 4817   In the last 24 hours, have you  needed more help than usual? 0 Filed at: 02/21/2024 1745   Have you been hospitalized for one or more nights during the past 6 months? 0 Filed at: 02/21/2024 1745   In general, do you see well? 0 Filed at: 02/21/2024 1745   In general, do you have serious problems with your memory? 1 Filed at: 02/21/2024 1745   Do you take more than three different medications every day? 1 Filed at: 02/21/2024 1745   ISAR Score 2 Filed at: 02/21/2024 1745                        SBIRT 20yo+      Flowsheet Row Most Recent Value   Initial Alcohol Screen: US AUDIT-C     1. How often do you have a drink containing alcohol? 0 Filed at: 02/21/2024 1745   2. How many drinks containing alcohol do you have on a typical day you are drinking?  0 Filed at: 02/21/2024 1745   3a. Male UNDER 65: How often do you have five or more drinks on one occasion? 0 Filed at: 02/21/2024 1745   3b. FEMALE Any Age, or MALE 65+: How often do you have 4 or more drinks on one occassion? 0 Filed at: 02/21/2024 1745   Audit-C Score 0 Filed at: 02/21/2024 1745   SHAUNNA: How many times in the past year have you...    Used an illegal drug or used a prescription medication for non-medical reasons? Never Filed at: 02/21/2024 1745                      Medical Decision Making  Amount and/or Complexity of Data Reviewed  Labs: ordered.  Radiology: ordered.    Risk  Prescription drug management.           Patient presents with hematuria. CT consistent with possible cystitis, will treat. Patient reports being able to urinate in ED without any gross hematuria or pain. No indication for thornton catheter at this time. Discussed incidental aneurysm and need to follow up. Discussed referral to urology for hematuria  Disposition  Final diagnoses:   Hematuria   Cystitis     Time reflects when diagnosis was documented in both MDM as applicable and the Disposition within this note       Time User Action Codes Description Comment    2/21/2024  8:57 PM Lindsey Romero Add [R31.9] Hematuria      2/21/2024  8:57 PM Lindsey Romero [N30.90] Cystitis           ED Disposition       ED Disposition   Discharge    Condition   Stable    Date/Time   Wed Feb 21, 2024 2057    Comment   Juniorteagan Huang discharge to home/self care.                   Follow-up Information       Follow up With Specialties Details Why Contact Info Additional Information    MARIA A Stover Nurse Practitioner, Family Medicine   [address]  [place] [address]  [phone]       [facility]   [address]  [address]  [facility] [address] [phone] [facility] [place], [address], [place], [place], [address] [phone]            Discharge Medication List as of 2/21/2024  8:59 PM        START taking these medications    Details   cephalexin (KEFLEX) 250 mg capsule Take 2 capsules (500 mg total) by mouth 4 (four) times a day for 7 days, Starting Wed 2/21/2024, Until Wed 2/28/2024, Normal           CONTINUE these medications which have NOT CHANGED    Details   aspirin (ECOTRIN LOW STRENGTH) 81 mg EC tablet Take by mouth, Historical Med      Aspirin Buf,CaCarb-MgCarb-MgO, 81 MG TABS Take by mouth, Historical Med      bisacodyl (DULCOLAX) 5 mg EC tablet Take 1 tablet (5 mg total) by mouth daily as needed for constipation, Starting Wed 4/6/2022, Normal      calcipotriene (DOVONEX) 0.005 % cream Apply topically, Historical Med      calcium polycarbophil (FIBERCON) 625 mg tablet Take 1 tablet (625 mg total) by mouth daily, Starting Wed 4/6/2022, Normal      CYANOCOBALAMIN PO Take by mouth, Historical Med      lisinopril (ZESTRIL) 10 mg tablet TAKE 1 TABLET BY MOUTH EVERY DAY, Starting Mon 9/11/2023, Normal      meloxicam (MOBIC) 7.5 mg tablet meloxicam 7.5 mg tablet   TAKE 1 TABLET BY MOUTH EVERY DAY AS NEEDED, Historical Med      memantine (NAMENDA) 10 mg tablet TAKE 1 TABLET BY MOUTH TWICE A DAY, Starting Mon 1/15/2024, Normal       mometasone (ELOCON) 0.1 % cream Apply topically daily, Starting Wed 8/30/2023, Normal      polyethylene glycol (GLYCOLAX) 17 GM/SCOOP powder Take 17 g by mouth daily, Starting Thu 2/8/2024, Normal      rosuvastatin (CRESTOR) 10 MG tablet Take 0.5 tablets (5 mg total) by mouth daily, Starting Fri 2/16/2024, Normal                 PDMP Review       None            ED Provider  Electronically Signed by             Lindsey Romero MD  02/21/24 1685

## 2024-02-22 LAB
BACTERIA UR CULT: ABNORMAL
BACTERIA UR CULT: ABNORMAL

## 2024-02-22 NOTE — DISCHARGE INSTRUCTIONS
Take antibiotics for possible cystitis, follow up with your doctor for continued symptoms, will also make referral to urology who will give you a call. 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 Return to ED for difficulty urinating or fevers

## 2024-02-28 ENCOUNTER — TELEPHONE (OUTPATIENT)
Dept: NEUROLOGY | Facility: CLINIC | Age: 84
End: 2024-02-28

## 2024-02-29 ENCOUNTER — CONSULT (OUTPATIENT)
Dept: NEUROLOGY | Facility: CLINIC | Age: 84
End: 2024-02-29
Payer: MEDICARE

## 2024-02-29 VITALS
BODY MASS INDEX: 22.98 KG/M2 | DIASTOLIC BLOOD PRESSURE: 72 MMHG | HEART RATE: 74 BPM | SYSTOLIC BLOOD PRESSURE: 120 MMHG | HEIGHT: 65 IN

## 2024-02-29 DIAGNOSIS — G30.0 MILD EARLY ONSET ALZHEIMER'S DEMENTIA, UNSPECIFIED WHETHER BEHAVIORAL, PSYCHOTIC, OR MOOD DISTURBANCE OR ANXIETY (HCC): ICD-10-CM

## 2024-02-29 DIAGNOSIS — F02.A0 MILD EARLY ONSET ALZHEIMER'S DEMENTIA, UNSPECIFIED WHETHER BEHAVIORAL, PSYCHOTIC, OR MOOD DISTURBANCE OR ANXIETY (HCC): ICD-10-CM

## 2024-02-29 PROCEDURE — 99204 OFFICE O/P NEW MOD 45 MIN: CPT | Performed by: PSYCHIATRY & NEUROLOGY

## 2024-02-29 NOTE — PATIENT INSTRUCTIONS
Please see [facility] with our team who can help with some of your symptoms and additional supports    Please obtain an MRI brain Neuroquant and some blood tests

## 2024-02-29 NOTE — PROGRESS NOTES
Name: Junior Huang   Age & Sex: 83 y.o. male   MRN: [id number]     This patient was discussed and staffed with Dr. Soto    Assessment/Plan:    MCI versus early dementia  Patient has reported memory problems and behavioral changes that are consistent with a MCI on MoCA testing, with possible behavioral overlay which could be can consistent with a more recently emerging frontal component.  Prior neuro quant imaging did not show any signs of neurodegenerative changes, but his symptoms have progressed significantly since 2021 when his last MRI was completed.  He will need further workup including nutritional evaluation and repeat MRI.  Patient does live alone and although he has visiting nurses who, on a daily basis, he should likely have further evaluation for safety at home    Plan:  MRI neuro quant  Referral to Senior care for additional resources and further evaluation given his memory dysfunction  Discussed the importance of staying active physically, mentally, socially-patient has a nurse, on a daily basis, gets out of the house on a daily basis, swims for an hour on a daily basis, and goes to [employer] every week  Can continue on his memantine as he is currently tolerating this but would not increase the dose at this time  Check memory labs  Follow-up in 6 months         Diagnoses and all orders for this visit:    Mild early onset Alzheimer's dementia, unspecified whether behavioral, psychotic, or mood disturbance or anxiety (HCC)  -     Ambulatory Referral to Neurology  -     Vitamin B12/Folate, Serum Panel; Future  -     TSH + Free T4; Future  -     Vitamin B1, whole blood; Future  -     Ambulatory Referral to Senior Care; Future  -     MRI brain NeuroQuant wo contrast; Future    Other orders  -     hydrocortisone 2.5 % cream;  (Patient not taking: Reported on 2/29/2024)          Subjective:         Junior Huang is a 83 y.o. male w/ PMH state cancer, hearing loss, MCI, generalized weakness, HLD who presents  "today with his \"visiting Kade\" for evaluation of memory loss and personality changes.  Per Junior, he does not believe that he has had many significant personality changes.  He denies hallucinations or delusions.  He denies agitation.  Setting nurse does mention that there are some concerns related to his recent behavior such as changing his well and cutting out family members.  He has reportedly had some suspicious behavior though he denies this at this time.  He did have an impaired MoCA () but overall he has been relatively functional.  He makes his own breakfast.  One of the visiting nurses/visiting [place] comes and helps with food shopping, cooking the rest of his meals, getting him out of the house on a daily basis.  He does go to [employer] on Sundays.  He does have a visiting nurse/visiting Kade 4 hours on a daily basis.  He does continue to keep his mind active such as with sudoku and crossword puzzles, he also reads, watches TV, plays cards, does other activities, and reports that he swims a mile a day in the pool in his basement.  He does have a prosthesis of his right leg which is why he does not frequently do a lot of walking.  He not had significant recent falls.  He is amenable to seeing Senior care.  He is not currently driving.  He notes that his symptoms may have started worsening over the past year and his wife  about a year ago.  He does have in his chart and MRI neuro quant which was negative for signs of neurodegeneration in , but suggests that there were concerns about memory problems from as far back as that. Retired worked for [employer]. Finished high school and finished technical degree.  He reports that he is not driving, he has a good diet.  We discussed the importance of staying active, physically, mentally, socially.  He is amenable to the workup including repeat MRI, Senior care referral, but he would like to hold off on PT/OT for now.        The following portions of the " patient's history were reviewed and updated as appropriate: He  has a past medical history of Memory loss, Personal history of irradiation, and Prostate cancer (HCC).  He   Patient Active Problem List    Diagnosis Date Noted    Dizziness 08/30/2023    Generalized weakness 01/06/2022    Post covid-19 condition, unspecified 11/23/2021    Driving safety issue 10/22/2021    Encounter to establish care 09/24/2021    Essential (primary) hypertension 09/24/2021    Chronic idiopathic constipation 09/24/2021    Prostate cancer (HCC) 09/23/2021    Mild dementia (HCC) 08/12/2021    Elevated BP without diagnosis of hypertension 08/12/2021    Other hyperlipidemia 08/12/2021    Other constipation 08/12/2021    Hearing loss 08/12/2021     He  has a past surgical history that includes Prostate biopsy.  His family history includes Colon cancer in his mother; Heart disease in his father; Nephrolithiasis in his father; Stroke in his father.  He  reports that he has never smoked. He has never used smokeless tobacco. He reports current alcohol use. He reports that he does not use drugs.  Current Outpatient Medications   Medication Sig Dispense Refill    bisacodyl (DULCOLAX) 5 mg EC tablet Take 1 tablet (5 mg total) by mouth daily as needed for constipation 30 tablet 3    calcipotriene (DOVONEX) 0.005 % cream Apply topically      calcium polycarbophil (FIBERCON) 625 mg tablet Take 1 tablet (625 mg total) by mouth daily 90 tablet 3    lisinopril (ZESTRIL) 10 mg tablet TAKE 1 TABLET BY MOUTH EVERY DAY 90 tablet 3    memantine (NAMENDA) 10 mg tablet TAKE 1 TABLET BY MOUTH TWICE A  tablet 1    mometasone (ELOCON) 0.1 % cream Apply topically daily 50 g 3    rosuvastatin (CRESTOR) 10 MG tablet Take 0.5 tablets (5 mg total) by mouth daily 90 tablet 1    aspirin (ECOTRIN LOW STRENGTH) 81 mg EC tablet Take by mouth (Patient not taking: Reported on 2/8/2024)      Aspirin Buf,CaCarb-MgCarb-MgO, 81 MG TABS Take by mouth (Patient not taking:  Reported on 2024)      CYANOCOBALAMIN PO Take by mouth (Patient not taking: Reported on 2024)      hydrocortisone 2.5 % cream  (Patient not taking: Reported on 2024)      meloxicam (MOBIC) 7.5 mg tablet meloxicam 7.5 mg tablet   TAKE 1 TABLET BY MOUTH EVERY DAY AS NEEDED (Patient not taking: Reported on 2024)      polyethylene glycol (GLYCOLAX) 17 GM/SCOOP powder Take 17 g by mouth daily (Patient not taking: Reported on 2024) 850 g 1     No current facility-administered medications for this visit.     Current Outpatient Medications on File Prior to Visit   Medication Sig    bisacodyl (DULCOLAX) 5 mg EC tablet Take 1 tablet (5 mg total) by mouth daily as needed for constipation    calcipotriene (DOVONEX) 0.005 % cream Apply topically    calcium polycarbophil (FIBERCON) 625 mg tablet Take 1 tablet (625 mg total) by mouth daily    [] cephalexin (KEFLEX) 250 mg capsule Take 2 capsules (500 mg total) by mouth 4 (four) times a day for 7 days    lisinopril (ZESTRIL) 10 mg tablet TAKE 1 TABLET BY MOUTH EVERY DAY    memantine (NAMENDA) 10 mg tablet TAKE 1 TABLET BY MOUTH TWICE A DAY    mometasone (ELOCON) 0.1 % cream Apply topically daily    rosuvastatin (CRESTOR) 10 MG tablet Take 0.5 tablets (5 mg total) by mouth daily    aspirin (ECOTRIN LOW STRENGTH) 81 mg EC tablet Take by mouth (Patient not taking: Reported on 2024)    Aspirin Buf,CaCarb-MgCarb-MgO, 81 MG TABS Take by mouth (Patient not taking: Reported on 2024)    CYANOCOBALAMIN PO Take by mouth (Patient not taking: Reported on 2024)    hydrocortisone 2.5 % cream  (Patient not taking: Reported on 2024)    meloxicam (MOBIC) 7.5 mg tablet meloxicam 7.5 mg tablet   TAKE 1 TABLET BY MOUTH EVERY DAY AS NEEDED (Patient not taking: Reported on 2024)    polyethylene glycol (GLYCOLAX) 17 GM/SCOOP powder Take 17 g by mouth daily (Patient not taking: Reported on 2024)     No current facility-administered medications on  "file prior to visit.     He has No Known Allergies..      Objective:        /72 (BP Location: Left arm, Patient Position: Sitting, Cuff Size: Standard)   Pulse 74   Ht 5' 4.5\" (1.638 m)   BMI 22.98 kg/m²     Physical Exam   Vitals reviewed.   Constitutional:    Not in acute distress. Normal appearance. Not ill-appearing, toxic-appearing or diaphoretic.   HENT:   Normocephalic and atraumatic.  External ear normal b/l. Nose normal. No congestion or rhinorrhea. Mucous membranes are moist.  Oropharynx is clear.   Eyes:    No scleral icterus.  No discharge b/l.  Conjunctivae normal.   Pulmonary:  Pulmonary effort is normal. No respiratory distress.   GI: abdomen not distended  Musculoskeletal: RLE prosthesis in place below mid thigh  Skin:   Skin is not pale.   Psychiatric:       Mood normal. Affect congruent    Neurological Exam  Mental Status Alert and oriented to person, place, and date/month/year but not day of week. Attention is normal. Speech is fluent and w/o dysarthria  Cranial Nerves  Visual fields full to confrontation. PERRL, brisk reactivity and consensual response intact b/l. EOMI w/o CN VI or III palsy. No clear nystagmus. Facial sensation and expression full, symmetric. Hearing grossly symmetric. Palate symmetric. Trapezius strength symmetric. No dysarthria, tongue symmetric without atrophy or obvious fasciculations   Motor Exam Muscle bulk grossly normal. Pronator drift absent b/l  Strength: R/L  Sensory Exam Light touch intact and symmetric   Gait, Coordination, and Reflexes FTN normal. No tremor observed. Reflexes: R/L Brachioradialis: 2+/2+  Biceps: 2+/2+        Review of Systems  Constitutional: Negative for chills, fatigue, fever and unexpected weight change.   Eyes: Negative for visual disturbance.   Respiratory: Negative for shortness of breath and wheezing.    Cardiovascular: Negative for chest pain.   Gastrointestinal: Negative for abdominal distention, abdominal pain, blood in stool, " constipation, diarrhea, nausea and vomiting.   Endocrine: Negative for polyuria.   Genitourinary: Negative for dysuria and hematuria.   Neurological: Negative for dizziness, tremors, weakness, numbness and headaches. Positive for memory disturbance  Psychiatric/Behavioral: Negative for sleep disturbance.       ~~~~~~~~~~~~~~~~~~~  Mildred Don MD  Neurology Resident, PGY-4  [facility]

## 2024-03-10 PROBLEM — R03.0 ELEVATED BP WITHOUT DIAGNOSIS OF HYPERTENSION: Status: RESOLVED | Noted: 2021-08-12 | Resolved: 2024-03-10

## 2024-03-10 PROBLEM — K59.00 CONSTIPATION: Status: ACTIVE | Noted: 2021-08-12

## 2024-03-10 PROBLEM — G30.9 SEVERE ALZHEIMER'S DEMENTIA (HCC): Status: ACTIVE | Noted: 2024-03-10

## 2024-03-10 PROBLEM — F02.C0 SEVERE ALZHEIMER'S DEMENTIA (HCC): Status: ACTIVE | Noted: 2024-03-10

## 2024-03-10 NOTE — ASSESSMENT & PLAN NOTE
Patient's blood pressure is at goal.  Patient does report he has been taking the lisinopril.  Continue with low-salt heart healthy diet.

## 2024-03-10 NOTE — ASSESSMENT & PLAN NOTE
Patient continues to have problems with constipation.  Discussed maintaining good fluid intake, fiber intake.  MiraLAX ordered continue with exercises.

## 2024-03-10 NOTE — ASSESSMENT & PLAN NOTE
Patient has a history of dementia.  Is on Namenda unknown if patient is taking medication.  Patient lives by himself.  Does have a health aide coming in for 4 hours daily.  There are  concerns regarding his mental capacity for making decisions.  Mini mental test completed in office.  Patient scored a 6 will scanned in chart.  Discussed maintaining safety.  Patient's kids do not live close to him.  Discussed with patient long-term plans regarding independent living.  Patient is not receptive feels like he is safe to live by himself.  Referral made to neuropsychiatry for assessment for mental capacity of executive decisions.

## 2024-03-20 ENCOUNTER — TELEPHONE (OUTPATIENT)
Dept: FAMILY MEDICINE CLINIC | Facility: CLINIC | Age: 84
End: 2024-03-20

## 2024-03-20 NOTE — TELEPHONE ENCOUNTER
Patient's daughter would like a letter is stating that he is capable of making his own decisions.

## 2024-03-21 ENCOUNTER — TELEPHONE (OUTPATIENT)
Dept: FAMILY MEDICINE CLINIC | Facility: CLINIC | Age: 84
End: 2024-03-21

## 2024-03-21 NOTE — TELEPHONE ENCOUNTER
Hi, Doris Hill calling here again. I just left a message and you can disregard that. It's just important that I do speak to Cheryl as soon as you can call me back at [phone] about my dad's care. He was diagnosed with early onset dementia and [facility] and that is about Oleksandr Hill. He's my dad, so I just need to talk to her because she's been seeing him for his condition and everything and so she knows what's going on. And I spoke to his neurologist too. So everything is actually staying the same, maintaining, not progressing and he's doing well. So but I just need to speak to her when she can. Thank you very much. I apologize for any confusion. Jim.

## 2024-03-21 NOTE — TELEPHONE ENCOUNTER
Referral made to neuropsychiatry for in depth assessment to determine competency. This referral was placed 2/8/24

## 2024-03-22 ENCOUNTER — TELEPHONE (OUTPATIENT)
Dept: FAMILY MEDICINE CLINIC | Facility: CLINIC | Age: 84
End: 2024-03-22

## 2024-03-22 NOTE — TELEPHONE ENCOUNTER
Hi, this is Doris Hill calling from my dad, Oleksandr Hill. If Cheryl could please give me a call back ASAP, I'd appreciate it At [phone]. Thank you. Jim.

## 2024-03-25 ENCOUNTER — TELEPHONE (OUTPATIENT)
Dept: GERIATRICS | Facility: CLINIC | Age: 84
End: 2024-03-25

## 2024-03-26 PROBLEM — Z76.89 ENCOUNTER TO ESTABLISH CARE: Status: RESOLVED | Noted: 2021-09-24 | Resolved: 2024-03-26

## 2024-03-28 ENCOUNTER — TELEPHONE (OUTPATIENT)
Dept: NEUROLOGY | Facility: CLINIC | Age: 84
End: 2024-03-28
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
## 2024-03-28 NOTE — LETTER
To Whom it May Concern     Junior Huang is currently under my neurological evaluation and despite signs of early cognitive changes, at least on exam in my office, patient is overall lucid and oriented and capable of making rational decisions in regards to his own care. We will however be completing additional workup for further characterization of his cognition       Sincerely,     Mildred Don MD

## 2024-03-29 ENCOUNTER — APPOINTMENT (OUTPATIENT)
Dept: LAB | Facility: HOSPITAL | Age: 84
End: 2024-03-29
Payer: MEDICARE

## 2024-03-29 DIAGNOSIS — G30.0 MILD EARLY ONSET ALZHEIMER'S DEMENTIA, UNSPECIFIED WHETHER BEHAVIORAL, PSYCHOTIC, OR MOOD DISTURBANCE OR ANXIETY (HCC): ICD-10-CM

## 2024-03-29 DIAGNOSIS — F02.A0 MILD EARLY ONSET ALZHEIMER'S DEMENTIA, UNSPECIFIED WHETHER BEHAVIORAL, PSYCHOTIC, OR MOOD DISTURBANCE OR ANXIETY (HCC): ICD-10-CM

## 2024-03-29 LAB
FOLATE SERPL-MCNC: >22.3 NG/ML
T4 FREE SERPL-MCNC: 0.72 NG/DL (ref 0.61–1.12)
TSH SERPL DL<=0.05 MIU/L-ACNC: 2.72 UIU/ML (ref 0.45–4.5)
VIT B12 SERPL-MCNC: 1193 PG/ML (ref 180–914)

## 2024-03-29 PROCEDURE — 82607 VITAMIN B-12: CPT

## 2024-03-29 PROCEDURE — 84443 ASSAY THYROID STIM HORMONE: CPT

## 2024-03-29 PROCEDURE — 36415 COLL VENOUS BLD VENIPUNCTURE: CPT

## 2024-03-29 PROCEDURE — 84425 ASSAY OF VITAMIN B-1: CPT

## 2024-03-29 PROCEDURE — 84439 ASSAY OF FREE THYROXINE: CPT

## 2024-03-29 PROCEDURE — 82746 ASSAY OF FOLIC ACID SERUM: CPT

## 2024-03-30 NOTE — TELEPHONE ENCOUNTER
3/27 at 11:24 am:    LION Palacios, this is Doris Haung calling about my dad Oleksandr Huang. His birthday is March 12th 1940. Just calling to talk to Dr. Levy. Dr. Levy saw him recently and I had some questions for her if you can calll me back at [phone]. I'd appreciate it.  _______________________________________________________________________  Please see previous note. This is being addressed.

## 2024-04-03 LAB — VIT B1 BLD-SCNC: 120.3 NMOL/L (ref 66.5–200)

## 2024-04-04 NOTE — TELEPHONE ENCOUNTER
4/2/24, 2:05    Hi, this is Doris Huang calling back DJ about my Dad, Junior Huang about that letter, and where to send it. If you could send it to Aniket Rhodes at [address] [place] [address], that would be great. I was also wondering if you'd be able to email it to me and that would be [email or web address]. And if you can't email it to me, would you be able to also send me a copy in the mail at [address] [facility] [address]. I know that's asking a lot to send it to the  and to me, but I just would really appreciate it. If it's easier for you to just send it all to me and have me make a copy, just call me back and let me know. All right, thank you so much bye now.     Jennifer or Sara,   There is a letter that was generated on 4/2/24.  Can one of you pls mail that letter to Aniket Rhodes at [address] [place] [address] and email a copy to [email or web address]/    Thank you very much

## 2024-04-11 ENCOUNTER — TELEPHONE (OUTPATIENT)
Age: 84
End: 2024-04-11

## 2024-04-11 NOTE — TELEPHONE ENCOUNTER
[facility]  [address] [place] [address], [address]  [place] [address]    [phone]    Telephone Intake: Geriatric Assessment     - Chart Review  Has this patient been seen by our department in the last 3 years? Yes   Please route to provider for chart review prior to scheduling and let the caller know that this phone intake will be reviewed IF -  Pt was recently hospitalized  Pt is prescribed medications for behavior management or has a history of psychiatric hospitalization  Pt plans to attend alone    Referral source:      [facility]       Caller who is scheduling/relationship to pt: Doris Gleny, daughter  Caller's phone number: [phone]    Reason for referral: Patient concerns , Family member concerns, and Provider concerns regarding memory concerns and behavior changes/concerns.  If there are behavioral concerns, is the pt prescribed medications to manage these? no   If so, how many? none   Has the patient ever had an inpatient psychiatric hospitalization? No,   What is the goal of the visit? Re-assessment and updated diagnosis (Previously seen by Dr. Leslie)     Has the patient been seen by a Neurologist or Geriatrician? Yes - Neurology referred to [facility]   If yes, is this appointment for a second opinion? No  Has the patient ever been diagnosed with dementia? Yes   Has the patient had an MRI NeuroQuant within the last 1 year? No, but one is ordered by Neurology (If so, please route to provider to determine if assessment + conference are needed or if only assessment should be scheduled)      Preferred language? English  Highest education level? College degree in chemical engineering.  Does the patient wear glasses? No, patient wears contacts  Does the patient use hearing aids? No     Is there a living will/healthcare POA in place/If so, who? Yes , Proxy, Doris Hill, daughter    Does the pt/caregiver have access for a virtual visit  (computer/smart phone with audio/video)?   No    Caller was informed:   Please make sure the pt is accompanied by someone who knows them well / caregiver / family member to participate in this appointment.   Who will accompany the pt (name and relationship)? Doris Hill, [place]  Phone number of person accompanying pt: [phone]  Please make sure the pt attends all appointments, including the assessment, care conference, follow-up, whether in-person or virtual.  For virtual visits, pt must be physically present in [facility].     Office packet mailed out to:  Doris Hill [address] [place] [address]  Added to wait list for sooner appointments/notified that calls can be short notice (same day/day prior)? Yes

## 2024-04-12 ENCOUNTER — TELEPHONE (OUTPATIENT)
Dept: NEUROLOGY | Facility: CLINIC | Age: 84
End: 2024-04-12

## 2024-04-12 NOTE — TELEPHONE ENCOUNTER
----- Message from Debora Meier RN sent at 4/2/2024 12:18 PM EDT -----    ----- Message -----  From: Mildred Don MD  Sent: 4/2/2024  10:16 AM EDT  To: Neurology [place] [facility]    Please let patient know his labs were normal (high B12 is ok) but if he would like to stop taking b12 he can for now    Thx!  N

## 2024-04-12 NOTE — TELEPHONE ENCOUNTER
----- Message from Debora Meier RN sent at 4/2/2024 12:18 PM EDT -----     ----- Message -----  From: Mildred Don MD  Sent: 4/2/2024  10:16 AM EDT  To: Neurology [place] [facility]     Please let patient know his labs were normal (high B12 is ok) but if he would like to stop taking b12 he can for now     Thx!  N      --------------------------------------------------    I spoke to patient and read him provider's message. He was appreciative of the update and he stated he may stop it for a little bit. No further questions or concerns at this time,

## 2024-05-21 ENCOUNTER — TELEPHONE (OUTPATIENT)
Age: 84
End: 2024-05-21

## 2024-05-21 NOTE — TELEPHONE ENCOUNTER
Left message with daughter, Doris Huang ([phone]) regarding rescheduled appointment from 8/6 to 8/21 due to provider out of office.    Left message to call back an reschedule if needed.

## 2024-05-23 ENCOUNTER — OFFICE VISIT (OUTPATIENT)
Dept: UROLOGY | Facility: CLINIC | Age: 84
End: 2024-05-23
Payer: MEDICARE

## 2024-05-23 VITALS
HEIGHT: 64 IN | DIASTOLIC BLOOD PRESSURE: 79 MMHG | OXYGEN SATURATION: 98 % | RESPIRATION RATE: 16 BRPM | WEIGHT: 139 LBS | SYSTOLIC BLOOD PRESSURE: 122 MMHG | TEMPERATURE: 98.6 F | BODY MASS INDEX: 23.73 KG/M2 | HEART RATE: 58 BPM

## 2024-05-23 DIAGNOSIS — R31.0 GROSS HEMATURIA: Primary | ICD-10-CM

## 2024-05-23 PROBLEM — C61 PROSTATE CANCER (HCC): Status: RESOLVED | Noted: 2021-09-23 | Resolved: 2024-05-23

## 2024-05-23 LAB
SL AMB  POCT GLUCOSE, UA: NORMAL
SL AMB LEUKOCYTE ESTERASE,UA: NORMAL
SL AMB POCT BILIRUBIN,UA: NORMAL
SL AMB POCT BLOOD,UA: NORMAL
SL AMB POCT CLARITY,UA: CLEAR
SL AMB POCT COLOR,UA: YELLOW
SL AMB POCT KETONES,UA: NORMAL
SL AMB POCT NITRITE,UA: NORMAL
SL AMB POCT PH,UA: 5
SL AMB POCT SPECIFIC GRAVITY,UA: 1.02
SL AMB POCT URINE PROTEIN: 15
SL AMB POCT UROBILINOGEN: 0.2

## 2024-05-23 PROCEDURE — 88112 CYTOPATH CELL ENHANCE TECH: CPT | Performed by: PATHOLOGY

## 2024-05-23 PROCEDURE — 99204 OFFICE O/P NEW MOD 45 MIN: CPT | Performed by: PHYSICIAN ASSISTANT

## 2024-05-23 PROCEDURE — 81002 URINALYSIS NONAUTO W/O SCOPE: CPT | Performed by: PHYSICIAN ASSISTANT

## 2024-05-23 NOTE — PROGRESS NOTES
5/23/2024      Chief Complaint   Patient presents with    Follow-up    Gross Hematuria         Assessment and Plan    84 y.o. male -- New patient    1. Gross hematuria, resolved   - UA today negative for infection or blood  - GERALDO today benign   - Discussed workup with BMP, CT renal protocol, urine cytology, and cystoscopy, PSA ordered  - Call with any questions or concerns in the meantime  - All questions answered; patient understands and agrees with plan       History of Present Illness  Junior Huang is a 84 y.o. male new patient here for evaluation of gross hematuria.    Denies seeing urology in the past. Patient was seen in the emergency department on 2/21/2024 for gross hematuria.  CT scan at that time showing diffuse bladder wall thickening for possible cystitis.  Negative for any other acute  findings.  Urine testing at that time positive for group B strep.  Patient was treated with antibiotics.  Patient having no symptoms at this time.  Denies tobacco history, exposures to chemicals on a daily basis, family history of  malignancies.  Denies prior  surgical manipulation or prior prostate cancer.  PSA in 2020 was 1.42. Of note, patient planning to move to [place] in near future.     Review of Systems   Constitutional:  Negative for activity change, appetite change, chills and fever.   HENT:  Negative for congestion and trouble swallowing.    Respiratory:  Negative for cough and shortness of breath.    Cardiovascular:  Negative for chest pain, palpitations and leg swelling.   Gastrointestinal:  Negative for abdominal pain, constipation, diarrhea, nausea and vomiting.   Genitourinary:  Positive for hematuria. Negative for difficulty urinating, dysuria, flank pain, frequency and urgency.   Musculoskeletal:  Negative for back pain and gait problem.   Skin:  Negative for wound.   Allergic/Immunologic: Negative for immunocompromised state.   Neurological:  Negative for dizziness and syncope.   Hematological:  Does  "not bruise/bleed easily.   Psychiatric/Behavioral:  Negative for confusion.    All other systems reviewed and are negative.      Vitals  Vitals:    05/23/24 1455   BP: 122/79   Pulse: 58   Resp: 16   Temp: 98.6 °F (37 °C)   SpO2: 98%   Weight: 63 kg (139 lb)   Height: 5' 4\" (1.626 m)       Physical Exam  Constitutional:       General: He is not in acute distress.     Appearance: Normal appearance. He is not ill-appearing, toxic-appearing or diaphoretic.   HENT:      Head: Normocephalic.      Nose: No congestion.   Eyes:      General: No scleral icterus.        Right eye: No discharge.         Left eye: No discharge.      Conjunctiva/sclera: Conjunctivae normal.      Pupils: Pupils are equal, round, and reactive to light.   Pulmonary:      Effort: Pulmonary effort is normal.   Genitourinary:     Comments: Prostate smooth, symmetrical, no palpable nodules    Musculoskeletal:      Cervical back: Normal range of motion.   Skin:     General: Skin is warm and dry.      Coloration: Skin is not jaundiced or pale.      Findings: No bruising, erythema, lesion or rash.   Neurological:      General: No focal deficit present.      Mental Status: He is alert and oriented to person, place, and time. Mental status is at baseline.      Gait: Gait normal.   Psychiatric:         Mood and Affect: Mood normal.         Behavior: Behavior normal.         Thought Content: Thought content normal.         Judgment: Judgment normal.           Past History  Past Medical History:   Diagnosis Date    Encounter to [facility] 09/24/2021    Memory loss     Personal history of irradiation     Prostate cancer (HCC)      Social History     Socioeconomic History    Marital status: /Civil Union     Spouse name: None    Number of children: None    Years of education: None    Highest education level: None   Occupational History    None   Tobacco Use    Smoking status: Never    Smokeless tobacco: Never   Vaping Use    Vaping status: Never Used "   Substance and Sexual Activity    Alcohol use: Yes     Comment: 2/day    Drug use: No    Sexual activity: Not Currently   Other Topics Concern    None   Social History Narrative    None     Social Determinants of Health     Financial Resource Strain: Low Risk  (5/30/2023)    Overall Financial Resource Strain (CARDIA)     Difficulty of Paying Living Expenses: Not hard at all   Food Insecurity: Not on file   Transportation Needs: No Transportation Needs (5/30/2023)    PRAPARE - Transportation     Lack of Transportation (Medical): No     Lack of Transportation (Non-Medical): No   Physical Activity: Not on file   Stress: Not on file   Social Connections: Not on file   Intimate Partner Violence: Not on file   Housing Stability: Not on file     Social History     Tobacco Use   Smoking Status Never   Smokeless Tobacco Never     Family History   Problem Relation Age of Onset    Colon cancer Mother     Nephrolithiasis Father     Stroke Father     Heart disease Father        The following portions of the patient's history were reviewed and updated as appropriate: allergies, current medications, past medical history, past social history, past surgical history and problem list.    Results  Recent Results (from the past 1 hour(s))   POCT urine dip    Collection Time: 05/23/24  2:57 PM   Result Value Ref Range    LEUKOCYTE ESTERASE,UA -     NITRITE,UA -     SL AMB POCT UROBILINOGEN 0.2     POCT URINE PROTEIN 15      PH,UA 5.0     BLOOD,UA -     SPECIFIC GRAVITY,UA 1.020     KETONES,UA -     BILIRUBIN,UA -     GLUCOSE, UA -      COLOR,UA yellow     CLARITY,UA clear    ]  Lab Results   Component Value Date    PSA 0.2 09/22/2015     Lab Results   Component Value Date    GLUCOSE 85 09/25/2015    CALCIUM 9.3 02/21/2024     09/25/2015    K 3.4 (L) 02/21/2024    CO2 28 02/21/2024     (H) 02/21/2024    BUN 23 02/21/2024    CREATININE 0.80 02/21/2024     Lab Results   Component Value Date    WBC 7.48 02/21/2024    HGB 13.1  02/21/2024    HCT 40.0 02/21/2024    MCV 94 02/21/2024     02/21/2024       Julissa Diaz PA-C

## 2024-05-28 PROCEDURE — 88112 CYTOPATH CELL ENHANCE TECH: CPT | Performed by: PATHOLOGY

## 2024-05-31 ENCOUNTER — OFFICE VISIT (OUTPATIENT)
Dept: FAMILY MEDICINE CLINIC | Facility: CLINIC | Age: 84
End: 2024-05-31
Payer: MEDICARE

## 2024-05-31 VITALS
HEIGHT: 64 IN | DIASTOLIC BLOOD PRESSURE: 68 MMHG | SYSTOLIC BLOOD PRESSURE: 116 MMHG | HEART RATE: 78 BPM | TEMPERATURE: 97.8 F | WEIGHT: 139 LBS | OXYGEN SATURATION: 95 % | BODY MASS INDEX: 23.73 KG/M2

## 2024-05-31 DIAGNOSIS — I10 ESSENTIAL (PRIMARY) HYPERTENSION: ICD-10-CM

## 2024-05-31 DIAGNOSIS — E78.49 OTHER HYPERLIPIDEMIA: Primary | ICD-10-CM

## 2024-05-31 DIAGNOSIS — G30.0 MILD EARLY ONSET ALZHEIMER'S DEMENTIA, UNSPECIFIED WHETHER BEHAVIORAL, PSYCHOTIC, OR MOOD DISTURBANCE OR ANXIETY (HCC): ICD-10-CM

## 2024-05-31 DIAGNOSIS — F02.A0 MILD EARLY ONSET ALZHEIMER'S DEMENTIA, UNSPECIFIED WHETHER BEHAVIORAL, PSYCHOTIC, OR MOOD DISTURBANCE OR ANXIETY (HCC): ICD-10-CM

## 2024-05-31 DIAGNOSIS — Z00.00 MEDICARE ANNUAL WELLNESS VISIT, SUBSEQUENT: ICD-10-CM

## 2024-05-31 PROBLEM — G30.9 SEVERE ALZHEIMER'S DEMENTIA (HCC): Status: RESOLVED | Noted: 2024-03-10 | Resolved: 2024-05-31

## 2024-05-31 PROBLEM — F02.C0 SEVERE ALZHEIMER'S DEMENTIA (HCC): Status: RESOLVED | Noted: 2024-03-10 | Resolved: 2024-05-31

## 2024-05-31 PROCEDURE — G0439 PPPS, SUBSEQ VISIT: HCPCS | Performed by: NURSE PRACTITIONER

## 2024-05-31 PROCEDURE — 99214 OFFICE O/P EST MOD 30 MIN: CPT | Performed by: NURSE PRACTITIONER

## 2024-05-31 RX ORDER — LISINOPRIL 10 MG/1
10 TABLET ORAL DAILY
Qty: 90 TABLET | Refills: 3 | Status: SHIPPED | OUTPATIENT
Start: 2024-05-31 | End: 2024-05-31 | Stop reason: SDUPTHER

## 2024-05-31 RX ORDER — MEMANTINE HYDROCHLORIDE 10 MG/1
10 TABLET ORAL 2 TIMES DAILY
Qty: 180 TABLET | Refills: 1 | Status: SHIPPED | OUTPATIENT
Start: 2024-05-31

## 2024-05-31 RX ORDER — LISINOPRIL 10 MG/1
10 TABLET ORAL DAILY
Qty: 90 TABLET | Refills: 3 | Status: SHIPPED | OUTPATIENT
Start: 2024-05-31

## 2024-05-31 RX ORDER — ROSUVASTATIN CALCIUM 5 MG/1
5 TABLET, COATED ORAL DAILY
Qty: 100 TABLET | Refills: 3 | Status: SHIPPED | OUTPATIENT
Start: 2024-05-31

## 2024-05-31 NOTE — PATIENT INSTRUCTIONS
Medicare Preventive Visit Patient Instructions  Thank you for completing your Welcome to Medicare Visit or Medicare Annual Wellness Visit today. Your next wellness visit will be due in one year (6/1/2025).  The screening/preventive services that you may require over the next 5-10 years are detailed below. Some tests may not apply to you based off risk factors and/or age. Screening tests ordered at today's visit but not completed yet may show as past due. Also, please note that scanned in results may not display below.  Preventive Screenings:  Service Recommendations Previous Testing/Comments   Colorectal Cancer Screening  Colonoscopy    Fecal Occult Blood Test (FOBT)/Fecal Immunochemical Test (FIT)  Fecal DNA/Cologuard Test  Flexible Sigmoidoscopy Age: 45-75 years old   Colonoscopy: every 10 years (May be performed more frequently if at higher risk)  OR  FOBT/FIT: every 1 year  OR  Cologuard: every 3 years  OR  Sigmoidoscopy: every 5 years  Screening may be recommended earlier than age 45 if at higher risk for colorectal cancer. Also, an individualized decision between you and your healthcare provider will decide whether screening between the ages of 76-85 would be appropriate. Colonoscopy: Not on file  FOBT/FIT: Not on file  Cologuard: Not on file  Sigmoidoscopy: Not on file    Screening Not Indicated     Prostate Cancer Screening Individualized decision between patient and health care provider in men between ages of 55-69   Medicare will cover every 12 months beginning on the day after your 50th birthday PSA: No results in last 5 years     History Prostate Cancer  Screening Not Indicated     Hepatitis C Screening Once for adults born between 1945 and 1965  More frequently in patients at high risk for Hepatitis C Hep C Antibody: 01/13/2022    Screening Current   Diabetes Screening 1-2 times per year if you're at risk for diabetes or have pre-diabetes Fasting glucose: 94 mg/dL (5/31/2023)  A1C: 5.5 %  (12/12/2020)  Screening Current   Cholesterol Screening Once every 5 years if you don't have a lipid disorder. May order more often based on risk factors. Lipid panel: 02/12/2024  Screening Not Indicated  History Lipid Disorder      Other Preventive Screenings Covered by Medicare:  Abdominal Aortic Aneurysm (AAA) Screening: covered once if your at risk. You're considered to be at risk if you have a family history of AAA or a male between the age of 65-75 who smoking at least 100 cigarettes in your lifetime.  Lung Cancer Screening: covers low dose CT scan once per year if you meet all of the following conditions: (1) Age 55-77; (2) No signs or symptoms of lung cancer; (3) Current smoker or have quit smoking within the last 15 years; (4) You have a tobacco smoking history of at least 20 pack years (packs per day x number of years you smoked); (5) You get a written order from a healthcare provider.  Glaucoma Screening: covered annually if you're considered high risk: (1) You have diabetes OR (2) Family history of glaucoma OR (3)  aged 50 and older OR (4)  American aged 65 and older  Osteoporosis Screening: covered every 2 years if you meet one of the following conditions: (1) Have a vertebral abnormality; (2) On glucocorticoid therapy for more than 3 months; (3) Have primary hyperparathyroidism; (4) On osteoporosis medications and need to assess response to drug therapy.  HIV Screening: covered annually if you're between the age of 15-65. Also covered annually if you are younger than 15 and older than 65 with risk factors for HIV infection. For pregnant patients, it is covered up to 3 times per pregnancy.    Immunizations:  Immunization Recommendations   Influenza Vaccine Annual influenza vaccination during flu season is recommended for all persons aged >= 6 months who do not have contraindications   Pneumococcal Vaccine   * Pneumococcal conjugate vaccine = PCV13 (Prevnar 13), PCV15  (Vaxneuvance), PCV20 (Prevnar 20)  * Pneumococcal polysaccharide vaccine = PPSV23 (Pneumovax) Adults 19-65 yo with certain risk factors or if 65+ yo  If never received any pneumonia vaccine: recommend Prevnar 20 (PCV20)  Give PCV20 if previously received 1 dose of PCV13 or PPSV23   Hepatitis B Vaccine 3 dose series if at intermediate or high risk (ex: diabetes, end stage renal disease, liver disease)   Respiratory syncytial virus (RSV) Vaccine - COVERED BY MEDICARE PART D  * RSVPreF3 (Arexvy) CDC recommends that adults 60 years of age and older may receive a single dose of RSV vaccine using shared clinical decision-making (SCDM)   Tetanus (Td) Vaccine - COST NOT COVERED BY MEDICARE PART B Following completion of primary series, a booster dose should be given every 10 years to maintain immunity against tetanus. Td may also be given as tetanus wound prophylaxis.   Tdap Vaccine - COST NOT COVERED BY MEDICARE PART B Recommended at least once for all adults. For pregnant patients, recommended with each pregnancy.   Shingles Vaccine (Shingrix) - COST NOT COVERED BY MEDICARE PART B  2 shot series recommended in those 19 years and older who have or will have weakened immune systems or those 50 years and older     Health Maintenance Due:      Topic Date Due   • Hepatitis C Screening  Completed     Immunizations Due:      Topic Date Due   • Pneumococcal Vaccine: 65+ Years (2 of 2 - PCV) 10/23/2021     Advance Directives   What are advance directives?  Advance directives are legal documents that state your wishes and plans for medical care. These plans are made ahead of time in case you lose your ability to make decisions for yourself. Advance directives can apply to any medical decision, such as the treatments you want, and if you want to donate organs.   What are the types of advance directives?  There are many types of advance directives, and each state has rules about how to use them. You may choose a combination of any of  the following:  Living will:  This is a written record of the treatment you want. You can also choose which treatments you do not want, which to limit, and which to stop at a certain time. This includes surgery, medicine, IV fluid, and tube feedings.   Durable power of  for healthcare (DPAHC):  This is a written record that states who you want to make healthcare choices for you when you are unable to make them for yourself. This person, called a proxy, is usually a family member or a friend. You may choose more than 1 proxy.  Do not resuscitate (DNR) order:  A DNR order is used in case your heart stops beating or you stop breathing. It is a request not to have certain forms of treatment, such as CPR. A DNR order may be included in other types of advance directives.  Medical directive:  This covers the care that you want if you are in a coma, near death, or unable to make decisions for yourself. You can list the treatments you want for each condition. Treatment may include pain medicine, surgery, blood transfusions, dialysis, IV or tube feedings, and a ventilator (breathing machine).  Values history:  This document has questions about your views, beliefs, and how you feel and think about life. This information can help others choose the care that you would choose.  Why are advance directives important?  An advance directive helps you control your care. Although spoken wishes may be used, it is better to have your wishes written down. Spoken wishes can be misunderstood, or not followed. Treatments may be given even if you do not want them. An advance directive may make it easier for your family to make difficult choices about your care.       © Copyright [employer] 2018 Information is for End User's use only and may not be sold, redistributed or otherwise used for commercial purposes. All illustrations and images included in CareNotes® are the copyrighted property of A.D.A[employer]M., Inc. or [employer]

## 2024-05-31 NOTE — TELEPHONE ENCOUNTER
Refill must be reviewed and completed by the office or provider. The refill is unable to be approved or denied by the medication management team.    : Receipt confirmed by pharmacy (5/31/2024  7:43

## 2024-05-31 NOTE — PROGRESS NOTES
Ambulatory Visit  Name: Junior Huang      : 1940      MRN: [id number]  Encounter Provider: MARIA A Stover  Encounter Date: 2024   Encounter department: [facility]IMARY [facility]    Assessment & Plan   1. Other hyperlipidemia  Assessment & Plan:  Patient has been taking 5 mg of Crestor denies any side effect from the medication.  Continue medication.  Get blood work done prior to next office visit continue heart healthy diet  Orders:  -     rosuvastatin (CRESTOR) 5 mg tablet; Take 1 tablet (5 mg total) by mouth daily  -     Lipid panel; Future  2. Essential (primary) hypertension  Assessment & Plan:  Blood pressure is at goal continue medication.  Get blood work prior to next office visit  Orders:  -     Albumin / creatinine urine ratio; Future  -     CBC and differential; Future  -     Comprehensive metabolic panel; Future  -     lisinopril (ZESTRIL) 10 mg tablet; Take 1 tablet (10 mg total) by mouth daily  3. Mild early onset Alzheimer's dementia, unspecified whether behavioral, psychotic, or mood disturbance or anxiety (HCC)  Assessment & Plan:  Patient has dementia, has been getting worse as per caregiver and daughter.  Has been taking Namenda.  Discussed maintaining safety.  Patient is moving to be closer to family that will assist in care.  Orders:  -     memantine (NAMENDA) 10 mg tablet; Take 1 tablet (10 mg total) by mouth 2 (two) times a day  4. Medicare annual wellness visit, subsequent  Assessment & Plan:  Medicare wellness completed.  Patient's daughter Doris is POA.  Up-to-date with screenings.  Patient is going to move to [place] in a few months advised to make appointment to follow-up prior to leaving.  Continue to maintain safety.  Patient does have a caregiver until at 5.  Discussed maintaining safety in the home.     Preventive health issues were discussed with patient, and age appropriate screening tests were ordered as noted in patient's After Visit Summary.  Personalized health advice and appropriate referrals for health education or preventive services given if needed, as noted in patient's After Visit Summary.    History of Present Illness   {Disappearing Hyperlinks I Encounters * My Last Note * Since Last Visit * History :55160}  Patient is here for Medicare wellness and to get refills on medication.  Generally is doing well.  Is going to move to [place] to be closer to his son, daughter from [place] is going to move with patient.  There are concerns that his dementia is getting worse       Patient Care Team:  MARIA A Stover as PCP - General (Nurse Practitioner)  MD Kilo Noyola MD    Review of Systems   Constitutional: Negative.    HENT: Negative.     Eyes: Negative.    Respiratory: Negative.     Cardiovascular: Negative.    Gastrointestinal: Negative.    Endocrine: Negative.    Genitourinary: Negative.    Musculoskeletal: Negative.    Skin: Negative.    Allergic/Immunologic: Negative.    Neurological:  Positive for weakness.   Hematological: Negative.    Psychiatric/Behavioral:  Positive for confusion and decreased concentration.      Medical History Reviewed by provider this encounter:  Tobacco  Allergies  Meds  Problems  Med Hx  Surg Hx  Fam Hx       Annual Wellness Visit Questionnaire   Junior is here for his Subsequent Wellness visit. Last Medicare Wellness visit information reviewed, patient interviewed and updates made to the record today.      Health Risk Assessment:   Patient rates overall health as very good. Patient feels that their physical health rating is same. Patient is very satisfied with their life. Eyesight was rated as same. Hearing was rated as same. Patient feels that their emotional and mental health rating is same. Patients states they are never, rarely angry. Patient states they are sometimes unusually tired/fatigued. Pain experienced in the last 7 days has been none. Patient states that he has  experienced no weight loss or gain in last 6 months.     Depression Screening:   PHQ-2 Score: 0      Fall Risk Screening:   In the past year, patient has experienced: no history of falling in past year      Home Safety:  Patient does not have trouble with stairs inside or outside of their home. Patient has working smoke alarms and has working carbon monoxide detector. Home safety hazards include: none.     Nutrition:   Current diet is Regular.     Medications:   Patient is not currently taking any over-the-counter supplements. Patient is not able to manage medications.     Activities of Daily Living (ADLs)/Instrumental Activities of Daily Living (IADLs):   Walk and transfer into and out of bed and chair?: Yes  Dress and groom yourself?: Yes    Bathe or shower yourself?: Yes    Feed yourself? Yes  Do your laundry/housekeeping?: Yes  Manage your money, pay your bills and track your expenses?: No  Make your own meals?: No    Do your own shopping?: No    Previous Hospitalizations:   Any hospitalizations or ED visits within the last 12 months?: No      Advance Care Planning:   Living will: Yes    Durable POA for healthcare: Yes    Advanced directive: Yes    End of Life Decisions reviewed with patient: Yes      Cognitive Screening:   Provider or family/friend/caregiver concerned regarding cognition?: Yes    Cognition Comments: Has a known diagnosis of dementia    PREVENTIVE SCREENINGS      Cardiovascular Screening:    General: Screening Not Indicated and History Lipid Disorder      Diabetes Screening:     General: Screening Current      Colorectal Cancer Screening:     General: Screening Not Indicated      Prostate Cancer Screening:    General: History Prostate Cancer and Screening Not Indicated      Osteoporosis Screening:    General: Patient Declines      Abdominal Aortic Aneurysm (AAA) Screening:        General: Screening Not Indicated      Lung Cancer Screening:     General: Screening Not Indicated      Hepatitis C  "Screening:    General: Screening Current    Screening, Brief Intervention, and Referral to Treatment (SBIRT)    Screening  Typical number of drinks in a day: 0  Typical number of drinks in a week: 0  Interpretation: Low risk drinking behavior.    Single Item Drug Screening:  How often have you used an illegal drug (including marijuana) or a prescription medication for non-medical reasons in the past year? never    Single Item Drug Screen Score: 0  Interpretation: Negative screen for possible drug use disorder    Brief Intervention  Alcohol & drug use screenings were reviewed. No concerns regarding substance use disorder identified.     Other Counseling Topics:   Car/seat belt/driving safety, skin self-exam, sunscreen and calcium and vitamin D intake and regular weightbearing exercise.     Social Determinants of Health     Financial Resource Strain: Low Risk  (5/30/2023)    Overall Financial Resource Strain (CARDIA)    • Difficulty of Paying Living Expenses: Not hard at all   Food Insecurity: No Food Insecurity (5/31/2024)    Hunger Vital Sign    • Worried About Running Out of Food in the Last Year: Never true    • Ran Out of Food in the Last Year: Never true   Transportation Needs: No Transportation Needs (5/31/2024)    PRAPARE - Transportation    • Lack of Transportation (Medical): No    • Lack of Transportation (Non-Medical): No   Housing Stability: Unknown (5/31/2024)    Housing Stability Vital Sign    • Unable to Pay for Housing in the Last Year: No    • Homeless in the Last Year: No   Utilities: Not At Risk (5/31/2024)    [facility] Utilities    • Threatened with loss of utilities: No     No results found.    Objective   {Disappearing Hyperlinks   Review Vitals * Enter New Vitals * Results Review * Labs * Imaging * Cardiology * Procedures * Lung Cancer Screening :58444}  /68 (BP Location: Left arm, Patient Position: Sitting, Cuff Size: Adult)   Pulse 78   Temp 97.8 °F (36.6 °C) (Tympanic)   Ht 5' 4\" (1.626 " m)   Wt 63 kg (139 lb)   SpO2 95%   BMI 23.86 kg/m²     Physical Exam  Vitals and nursing note reviewed.   Constitutional:       Appearance: Normal appearance. He is well-developed.   HENT:      Head: Normocephalic and atraumatic.   Cardiovascular:      Rate and Rhythm: Normal rate and regular rhythm.   Abdominal:      Palpations: Abdomen is soft.   Musculoskeletal:         General: Normal range of motion.   Skin:     General: Skin is warm and dry.   Neurological:      Mental Status: He is alert and oriented to person, place, and time.      Sensory: Sensory deficit present.      Motor: Weakness present.   Psychiatric:         Attention and Perception: Attention and perception normal.         Mood and Affect: Mood normal.         Speech: Speech normal.         Behavior: Behavior normal.         Thought Content: Thought content normal.         Cognition and Memory: He exhibits impaired recent memory.         Judgment: Judgment normal.       Administrative Statements {Disappearing Hyperlinks I  Level of Service * [facility]}

## 2024-05-31 NOTE — ASSESSMENT & PLAN NOTE
Patient has been taking 5 mg of Crestor denies any side effect from the medication.  Continue medication.  Get blood work done prior to next office visit continue heart healthy diet

## 2024-05-31 NOTE — ASSESSMENT & PLAN NOTE
Patient has dementia, has been getting worse as per caregiver and daughter.  Has been taking Namenda.  Discussed maintaining safety.  Patient is moving to be closer to family that will assist in care.

## 2024-05-31 NOTE — ASSESSMENT & PLAN NOTE
Medicare wellness completed.  Patient's daughter Doris is POA.  Up-to-date with screenings.  Patient is going to move to [place] in a few months advised to make appointment to follow-up prior to leaving.  Continue to maintain safety.  Patient does have a caregiver until at 5.  Discussed maintaining safety in the home.

## 2024-06-30 PROBLEM — Z00.00 MEDICARE ANNUAL WELLNESS VISIT, SUBSEQUENT: Status: RESOLVED | Noted: 2024-05-31 | Resolved: 2024-06-30

## 2024-07-01 ENCOUNTER — TELEPHONE (OUTPATIENT)
Dept: OTHER | Facility: OTHER | Age: 84
End: 2024-07-01

## 2024-07-16 NOTE — TELEPHONE ENCOUNTER
Called patient's daughter Doris regarding to infrom that he is no longer under care  of our provider, so she contact neurology dept.  
Patient's daughter Doris ([phone]) called and requesting for letter stating that current condition of the patient, that he is having early onset dementia, and  would like to keep keep this letter. She want letter to send directly to her address which is, [address] [facility].      Please review   
[id number]